# Patient Record
Sex: FEMALE | Race: WHITE | NOT HISPANIC OR LATINO | Employment: FULL TIME | URBAN - METROPOLITAN AREA
[De-identification: names, ages, dates, MRNs, and addresses within clinical notes are randomized per-mention and may not be internally consistent; named-entity substitution may affect disease eponyms.]

---

## 2017-02-06 RX ORDER — PAROXETINE HYDROCHLORIDE 20 MG/1
TABLET, FILM COATED ORAL
Qty: 90 TAB | Refills: 2 | Status: SHIPPED | OUTPATIENT
Start: 2017-02-06 | End: 2017-04-14

## 2017-02-07 DIAGNOSIS — G43.009 MIGRAINE WITHOUT AURA AND WITHOUT STATUS MIGRAINOSUS, NOT INTRACTABLE: ICD-10-CM

## 2017-02-07 RX ORDER — NARATRIPTAN 2.5 MG/1
TABLET ORAL
Qty: 6 TAB | Refills: 1 | Status: SHIPPED | OUTPATIENT
Start: 2017-02-07 | End: 2017-06-12 | Stop reason: SDUPTHER

## 2017-03-21 ENCOUNTER — PATIENT MESSAGE (OUTPATIENT)
Dept: INTERNAL MEDICINE | Facility: IMAGING CENTER | Age: 37
End: 2017-03-21

## 2017-03-21 NOTE — TELEPHONE ENCOUNTER
From: Breanne Andrea  To: Caro Paz M.D.  Sent: 3/21/2017 1:05 PM PDT  Subject: Prescription Question    Hi Dr. Paz,    Over the last few months, I have really been struggling with my depression and just can't seem to get over this antonella. I am currently taking 20mg of Paxil daily and was wondering if maybe upping it to 30 or 40 may be okay? I am very irritable, just want to sleep all the time, and basically find no nicho in anything right now. However, I am not having any suicidal thoughts. Just sucking at life right now. Let me know if I need to come in or if I can try upping my dose.    Thanks.

## 2017-03-23 ENCOUNTER — OFFICE VISIT (OUTPATIENT)
Dept: INTERNAL MEDICINE | Facility: IMAGING CENTER | Age: 37
End: 2017-03-23
Payer: COMMERCIAL

## 2017-03-23 VITALS
BODY MASS INDEX: 27.66 KG/M2 | OXYGEN SATURATION: 97 % | TEMPERATURE: 97.7 F | HEIGHT: 65 IN | HEART RATE: 78 BPM | WEIGHT: 166 LBS | RESPIRATION RATE: 14 BRPM | DIASTOLIC BLOOD PRESSURE: 70 MMHG | SYSTOLIC BLOOD PRESSURE: 110 MMHG

## 2017-03-23 DIAGNOSIS — F33.41 RECURRENT MAJOR DEPRESSIVE DISORDER, IN PARTIAL REMISSION (HCC): ICD-10-CM

## 2017-03-23 PROCEDURE — 99214 OFFICE O/P EST MOD 30 MIN: CPT | Performed by: FAMILY MEDICINE

## 2017-03-23 NOTE — MR AVS SNAPSHOT
"Breanne KATARINA Emre   3/23/2017 4:00 PM   Office Visit   MRN: [id number]    [facility]   Dept Phone:  [phone]    Description:  Female : 1980   Provider:  Caro Paz M.D.           Allergies as of 3/23/2017     No Known Allergies      Vital Signs     Blood Pressure Pulse Temperature Respirations Height Weight    110/70 mmHg 78 36.5 °C (97.7 °F) 14 1.651 m (5' 5\") 75.297 kg (166 lb)    Body Mass Index Oxygen Saturation Smoking Status             27.62 kg/m2 97% Never Smoker          Basic Information     Date Of Birth Sex Race Ethnicity Preferred Language    1980 Female White Non- English      Problem List              ICD-10-CM Priority Class Noted - Resolved    Depression F32.9   2014 - Present    Gastroesophageal reflux disease without esophagitis K21.9   2015 - Present      Health Maintenance        Date Due Completion Dates    IMM DTaP/Tdap/Td Vaccine (1 - Tdap) 2001 ---    IMM INFLUENZA (1) 2016 ---    PAP SMEAR 2019, 2013 (Done)    Override on 2013: Done (women health)            Current Immunizations     No immunizations on file.      Below and/or attached are the medications your provider expects you to take. Review all of your home medications and newly ordered medications with your provider and/or pharmacist. Follow medication instructions as directed by your provider and/or pharmacist. Please keep your medication list with you and share with your provider. Update the information when medications are discontinued, doses are changed, or new medications (including over-the-counter products) are added; and carry medication information at all times in the event of emergency situations     Allergies:  No Known Allergies          Medications  Valid as of: 2017 -  4:27 PM    Generic Name Brand Name Tablet Size Instructions for use    Aspirin (Tablet Delayed Response) ECOTRIN 81 MG Take 81 mg by mouth " every day.        Desogestrel-Ethinyl Estradiol (Tab) KARIVA 0.15-0.02/0.01 MG (21/5) Take active pill for 84 days straight, then inactive pill for 7 days. Please dispense 4 packets.        Naratriptan HCl (Tab) AMERGE 2.5 MG Take one tablet at onset of migraine. May repeat one time in 24 hours.        Omeprazole (CAPSULE DELAYED RELEASE) PRILOSEC 20 MG Take 20 mg by mouth every day.        PARoxetine HCl (Tab) PAXIL 20 MG TAKE 1 TABLET DAILY WITH FOOD        .                 Medicines prescribed today were sent to:     [facility]ARMACY #[id number] - [place], [place] - [address]    [address] [place] [address]    Phone: [phone] Fax: [phone]    Open 24 Hours?: No    [facility] - [place] [address]    Phone: [phone] Fax: [phone]    Open 24 Hours?: No    [facility] - [place] [address]    Phone: [phone] Fax: [phone]    Open 24 Hours?: No      Medication refill instructions:       If your prescription bottle indicates you have medication refills left, it is not necessary to call your provider’s office. Please contact your pharmacy and they will refill your medication.    If your prescription bottle indicates you do not have any refills left, you may request refills at any time through one of the following ways: The online [employer] system (except Urgent Care), by calling your provider’s office, or by asking your pharmacy to contact your provider’s office with a refill request. Medication refills are processed only during regular business hours and may not be available until the next business day. Your provider may request additional information or to have a follow-up visit with you prior to refilling your medication.   *Please Note: Medication refills are assigned a new Rx number when refilled electronically. Your pharmacy may  indicate that no refills were authorized even though a new prescription for the same medication is available at the pharmacy. Please request the medicine by name with the pharmacy before contacting your provider for a refill.           [employer] Access Code: Activation code not generated  Current Vericept Status: Active

## 2017-03-24 NOTE — PROGRESS NOTES
Chief Complaint   Patient presents with   • Depression       HISTORY OF PRESENT ILLNESS: Patient is a 37 y.o. female established patient who presents today to discuss depression.  She has a history of depression. She has been on Paxil for about 4 years. She was doing well until about 3 months ago. She noticed her mood was down. Motivation is down. Difficulty focusing and concentrating. No thoughts of hopelessness or suicide. She continues to work. She states she feels overwhelmed and finds it more difficult to finish tasks. She does have a good support system and family and friends. She is .  Her appetite has been good. In fact she states she is probably eating too much. She is not sleeping well. She has difficulty both initiating and maintaining sleep. States she feels like her mind is running.  She has been under some increased stress at work. There have been changes. But she still enjoys her job.  Her  has also started his own business and she is trying to help with that.  She has not been exercising but would like to. States she just has a difficult time getting motivated to exercise.  She has had counseling in the past. Is really not interested in that right now.  She occasionally drank alcohol on the weekend. Denies that it is a problem.  No recreational drug use.    She remembers trying a couple other medications prior to Paxil. Her record shows that she try Lexapro. She believes she tried citalopram. She had been doing well on the Paxil until the past few months. She feels like it just is not working as well.    Patient Active Problem List    Diagnosis Date Noted   • Gastroesophageal reflux disease without esophagitis 07/29/2015   • Depression 02/18/2014     Current Outpatient Prescriptions on File Prior to Visit   Medication Sig Dispense Refill   • paroxetine (PAXIL) 20 MG Tab TAKE 1 TABLET DAILY WITH FOOD 90 Tab 2   • desogestrel-ethinyl estradiol (KARIVA) 0.15-0.02/0.01 MG (21/5) per tablet  "Take active pill for 84 days straight, then inactive pill for 7 days. Please dispense 4 packets. 112 Tab 3   • aspirin EC (ECOTRIN) 81 MG TBEC Take 81 mg by mouth every day.     • omeprazole (PRILOSEC) 20 MG CPDR Take 20 mg by mouth every day.     • naratriptan (AMERGE) 2.5 MG tablet Take one tablet at onset of migraine. May repeat one time in 24 hours. 6 Tab 1     No current facility-administered medications on file prior to visit.       Past medical, surgical, family, and social history is reviewed and updated in Epic chart by me today.   Medications and allergies reviewed and updated in Epic chart by me today.     REVIEW OF SYSTEMS:  GENERAL: No fatigue, slight weight gain.  HEENT:  Ears--no earache, no change in hearing, no dizziness, no tinnitus.                 Eyes--no blurred vision, no discharge or pain                 Throat--No sore throat, no dysphagia, no hoarseness  CV:  No chest pain,dyspnea,palpitations or edema.  RESP:  No sob,cough,wheezing or hemoptysis.  GI: No dysphagia, heartburn,abdominal pain, nausea, vomiting, diarrhea or constipation.       No melena, jaundice, bleeding, incontinence or change in bowel habits.  :  No dysuria, polyuria, hematuria, incontinence, or nocturia.  MS:  No joint swelling, myalgias, or arthralgias.  NEURO:  No seizures, syncope, paralysis, tremor, or weakness.  SKIN: No new or concerning skin lesions or changes.   PSYCH: Mood as above.    Filed Vitals:    03/23/17 1600   BP: 110/70   Pulse: 78   Temp: 36.5 °C (97.7 °F)   Resp: 14   Height: 1.651 m (5' 5\")   Weight: 75.297 kg (166 lb)   SpO2: 97%     Physical Exam:  Gen: Well developed, well nourished. No acute distress.  Neck:  Supple, no adenopathy or thyromegaly.  Heart:  Regular rate and rhythm.  Normal S1, S2. No murmur, gallop or rub.  Lungs:  Clear, No wheezes,rales or rhonchi.  Extremities:  No edema.  Psych: Mood and affect are as above. She makes good eye contact. Affect is varied. Thought process is " appropriate    Assessment/Plan:  1. Recurrent major depressive disorder, in partial remission (CMS-HCC)   we discussed increasing Paxil or trying something new and she is agreeable to trying  Trintellix. We'll start 10 mg. She'll discontinue Paxil. Encouraged meditation, a walk daily. She will try and do that. We'll still consider counseling and will follow up in 3-4 weeks. Encouraged her to call for any increase in her symptoms, side effects of the medication or concerns.      Follow-up in 3-4 weeks.     30 minutes was face-to-face with patient, greater than 50% of this time spent in education, counseling and developing a treatment plan.

## 2017-04-14 ENCOUNTER — OFFICE VISIT (OUTPATIENT)
Dept: INTERNAL MEDICINE | Facility: IMAGING CENTER | Age: 37
End: 2017-04-14
Payer: COMMERCIAL

## 2017-04-14 VITALS
DIASTOLIC BLOOD PRESSURE: 70 MMHG | HEART RATE: 81 BPM | SYSTOLIC BLOOD PRESSURE: 112 MMHG | WEIGHT: 168 LBS | HEIGHT: 65 IN | OXYGEN SATURATION: 96 % | BODY MASS INDEX: 27.99 KG/M2 | RESPIRATION RATE: 14 BRPM | TEMPERATURE: 97.2 F

## 2017-04-14 DIAGNOSIS — G43.829 MENSTRUAL MIGRAINE WITHOUT STATUS MIGRAINOSUS, NOT INTRACTABLE: ICD-10-CM

## 2017-04-14 DIAGNOSIS — F33.41 RECURRENT MAJOR DEPRESSIVE DISORDER, IN PARTIAL REMISSION (HCC): ICD-10-CM

## 2017-04-14 PROCEDURE — 99213 OFFICE O/P EST LOW 20 MIN: CPT | Performed by: FAMILY MEDICINE

## 2017-04-14 NOTE — MR AVS SNAPSHOT
"        Breanne Andrea   2017 8:30 AM   Office Visit   MRN: [id number]    Department:  [facility]   Dept Phone:  [phone]    Description:  Female : 1980   Provider:  Caro Paz M.D.           Reason for Visit     Follow-Up           Allergies as of 2017     No Known Allergies      You were diagnosed with     Recurrent major depressive disorder, in partial remission (CMS-[facility])   [8954920]       Menstrual migraine without status migrainosus, not intractable   [[id number]]         Vital Signs     Blood Pressure Pulse Temperature Respirations Height Weight    112/70 mmHg 81 36.2 °C (97.2 °F) 14 1.651 m (5' 5\") 76.204 kg (168 lb)    Body Mass Index Oxygen Saturation Smoking Status             27.96 kg/m2 96% Never Smoker          Basic Information     Date Of Birth Sex Race Ethnicity Preferred Language    1980 Female White Non- English      Problem List              ICD-10-CM Priority Class Noted - Resolved    Depression F32.9   2014 - Present    Gastroesophageal reflux disease without esophagitis K21.9   2015 - Present      Health Maintenance        Date Due Completion Dates    IMM DTaP/Tdap/Td Vaccine (1 - Tdap) 2001 ---    PAP SMEAR 2019, 2013 (Done)    Override on 2013: Done (women health)            Current Immunizations     No immunizations on file.      Below and/or attached are the medications your provider expects you to take. Review all of your home medications and newly ordered medications with your provider and/or pharmacist. Follow medication instructions as directed by your provider and/or pharmacist. Please keep your medication list with you and share with your provider. Update the information when medications are discontinued, doses are changed, or new medications (including over-the-counter products) are added; and carry medication information at all times in the event of emergency situations     Allergies:  No Known " Allergies          Medications  Valid as of: April 14, 2017 - 10:10 AM    Generic Name Brand Name Tablet Size Instructions for use    Aspirin (Tablet Delayed Response) ECOTRIN 81 MG Take 81 mg by mouth every day.        Desogestrel-Ethinyl Estradiol (Tab) KARIVA 0.15-0.02/0.01 MG (21/5) Take active pill for 84 days straight, then inactive pill for 7 days. Please dispense 4 packets.        Naratriptan HCl (Tab) AMERGE 2.5 MG Take one tablet at onset of migraine. May repeat one time in 24 hours.        Omeprazole (CAPSULE DELAYED RELEASE) PRILOSEC 20 MG Take 20 mg by mouth every day.        Vortioxetine HBr (Tab) Vortioxetine HBr 10 MG Take 10 mg by mouth every day.        .                 Medicines prescribed today were sent to:     [facility]ACY #[id number] - [place], [place] [address]    [address] [place] [place] [address]    Phone: [phone] Fax: [phone]    Open 24 Hours?: No    [facility] - [place] [address]    Phone: [phone] Fax: [phone]    Open 24 Hours?: No    [facility] - [place] [address]    Phone: [phone] Fax: [phone]    Open 24 Hours?: No      Medication refill instructions:       If your prescription bottle indicates you have medication refills left, it is not necessary to call your provider’s office. Please contact your pharmacy and they will refill your medication.    If your prescription bottle indicates you do not have any refills left, you may request refills at any time through one of the following ways: The online [employer] system (except Urgent Care), by calling your provider’s office, or by asking your pharmacy to contact your provider’s office with a refill request. Medication refills are processed only during regular business hours and may not be available until the next business day. Your provider may request  additional information or to have a follow-up visit with you prior to refilling your medication.   *Please Note: Medication refills are assigned a new Rx number when refilled electronically. Your pharmacy may indicate that no refills were authorized even though a new prescription for the same medication is available at the pharmacy. Please request the medicine by name with the pharmacy before contacting your provider for a refill.           [employer] Access Code: Activation code not generated  Current Shuamet Status: Active

## 2017-04-17 RX ORDER — DESOGESTREL AND ETHINYL ESTRADIOL AND ETHINYL ESTRADIOL 21-5 (28)
KIT ORAL
Qty: 112 TAB | Refills: 2 | Status: SHIPPED | OUTPATIENT
Start: 2017-04-17 | End: 2018-01-14 | Stop reason: SDUPTHER

## 2017-06-12 RX ORDER — NARATRIPTAN 2.5 MG/1
TABLET ORAL
Qty: 6 TAB | Refills: 1 | Status: SHIPPED | OUTPATIENT
Start: 2017-06-12 | End: 2017-09-18 | Stop reason: SDUPTHER

## 2017-06-14 ENCOUNTER — OFFICE VISIT (OUTPATIENT)
Dept: INTERNAL MEDICINE | Facility: IMAGING CENTER | Age: 37
End: 2017-06-14
Payer: COMMERCIAL

## 2017-06-14 VITALS
OXYGEN SATURATION: 97 % | RESPIRATION RATE: 14 BRPM | WEIGHT: 168 LBS | BODY MASS INDEX: 27.99 KG/M2 | HEART RATE: 84 BPM | DIASTOLIC BLOOD PRESSURE: 70 MMHG | HEIGHT: 65 IN | SYSTOLIC BLOOD PRESSURE: 110 MMHG | TEMPERATURE: 97.9 F

## 2017-06-14 DIAGNOSIS — K52.9 GASTROENTERITIS: ICD-10-CM

## 2017-06-14 DIAGNOSIS — R11.0 NAUSEA: ICD-10-CM

## 2017-06-14 PROCEDURE — 99213 OFFICE O/P EST LOW 20 MIN: CPT | Performed by: FAMILY MEDICINE

## 2017-06-14 RX ORDER — ONDANSETRON 4 MG/1
4 TABLET, ORALLY DISINTEGRATING ORAL EVERY 6 HOURS PRN
Qty: 20 TAB | Refills: 0 | Status: SHIPPED | OUTPATIENT
Start: 2017-06-14 | End: 2017-08-24

## 2017-06-14 NOTE — MR AVS SNAPSHOT
"Breanne BRAY Emre   2017 9:00 AM   Office Visit   MRN: [id number]    [facility]   Dept Phone:  [phone]    Description:  Female : 1980   Provider:  Caro Paz M.D.           Reason for Visit     Nausea GI sx      Allergies as of 2017     No Known Allergies      You were diagnosed with     Nausea   [[id number]]       Gastroenteritis   [[id number]]         Vital Signs     Blood Pressure Pulse Temperature Respirations Height Weight    110/70 mmHg 84 36.6 °C (97.9 °F) 14 1.651 m (5' 5\") 76.204 kg (168 lb)    Body Mass Index Oxygen Saturation Smoking Status             27.96 kg/m2 97% Never Smoker          Basic Information     Date Of Birth Sex Race Ethnicity Preferred Language    1980 Female White Non- English      Problem List              ICD-10-CM Priority Class Noted - Resolved    Depression F32.9   2014 - Present    Gastroesophageal reflux disease without esophagitis K21.9   2015 - Present      Health Maintenance        Date Due Completion Dates    IMM DTaP/Tdap/Td Vaccine (1 - Tdap) 2001 ---    PAP SMEAR 2019, 2013 (Done)    Override on 2013: Done (women health)            Current Immunizations     No immunizations on file.      Below and/or attached are the medications your provider expects you to take. Review all of your home medications and newly ordered medications with your provider and/or pharmacist. Follow medication instructions as directed by your provider and/or pharmacist. Please keep your medication list with you and share with your provider. Update the information when medications are discontinued, doses are changed, or new medications (including over-the-counter products) are added; and carry medication information at all times in the event of emergency situations     Allergies:  No Known Allergies          Medications  Valid as of: 2017 - 10:37 AM    Generic Name Brand Name Tablet Size " Instructions for use    Aspirin (Tablet Delayed Response) ECOTRIN 81 MG Take 81 mg by mouth every day.        Desogestrel-Ethinyl Estradiol (Tab) VIORELE 0.15-0.02/0.01 MG (21/5) TAKE 1 ACTIVE TABLET FOR 84 DAYS STRAIGHT, THEN TAKE INACTIVE TABLET FOR 7 DAYS AS DIRECTED        Naratriptan HCl (Tab) AMERGE 2.5 MG TAKE 1 TABLET AT ONSET OF MIGRAINE. MAY REPEAT ONE TIME IN 24 HOURS        Omeprazole (CAPSULE DELAYED RELEASE) PRILOSEC 20 MG Take 20 mg by mouth every day.        Ondansetron (TABLET DISPERSIBLE) ZOFRAN ODT 4 MG Take 1 Tab by mouth every 6 hours as needed for Nausea/Vomiting.        Vortioxetine HBr (Tab) Vortioxetine HBr 10 MG Take 10 mg by mouth every day.        .                 Medicines prescribed today were sent to:     [facility]ARMACY #[id number] - [place], [place] [address]    [address] [place] [address]    Phone: [phone] Fax: [phone]    Open 24 Hours?: No    [facility] - [place] [address]    Phone: [phone] Fax: [phone]    Open 24 Hours?: No    [facility] - [place] [address]    Phone: [phone] Fax: [phone]    Open 24 Hours?: No      Medication refill instructions:       If your prescription bottle indicates you have medication refills left, it is not necessary to call your provider’s office. Please contact your pharmacy and they will refill your medication.    If your prescription bottle indicates you do not have any refills left, you may request refills at any time through one of the following ways: The online [employer] system (except Urgent Care), by calling your provider’s office, or by asking your pharmacy to contact your provider’s office with a refill request. Medication refills are processed only during regular business hours and may not be available until the next business day. Your provider may  request additional information or to have a follow-up visit with you prior to refilling your medication.   *Please Note: Medication refills are assigned a new Rx number when refilled electronically. Your pharmacy may indicate that no refills were authorized even though a new prescription for the same medication is available at the pharmacy. Please request the medicine by name with the pharmacy before contacting your provider for a refill.           [employer] Access Code: Activation code not generated  Current Ferevot Status: Active

## 2017-06-14 NOTE — PROGRESS NOTES
Chief Complaint   Patient presents with   • Nausea     GI sx       HISTORY OF PRESENT ILLNESS: Patient is a 37 y.o. female established patient who presents today complaining of nausea, vomiting and diarrhea.  She started about 10 days ago with a migraine. She was taking extra Advil at the time and a couple days later developed nausea. She thought at first it was secondary to the Advil. Then she developed vomiting and diarrhea for 2 days. The diarrhea has resolved. She still nauseous. She vomited again last night. The nausea is worse after she eats. She's had some abdominal cramping. No fevers or chills. No recent travel. No one else has been sick. She is now starting to follow constipated because she hasn't had a good bowel movement for a few days. She did have a bowel movement yesterday morning it was very small. She denies any blood, no hematemesis, no blood or dark stools. She has continued on omeprazole daily. She does have a history of GERD. She is not concerned about pregnancy. She just had her menses and she is taking her birth control pills every day.      Patient Active Problem List    Diagnosis Date Noted   • Gastroesophageal reflux disease without esophagitis 07/29/2015   • Depression 02/18/2014     Current Outpatient Prescriptions on File Prior to Visit   Medication Sig Dispense Refill   • naratriptan (AMERGE) 2.5 MG tablet TAKE 1 TABLET AT ONSET OF MIGRAINE. MAY REPEAT ONE TIME IN 24 HOURS 6 Tab 1   • VIORELE 0.15-0.02/0.01 MG (21/5) per tablet TAKE 1 ACTIVE TABLET FOR 84 DAYS STRAIGHT, THEN TAKE INACTIVE TABLET FOR 7 DAYS AS DIRECTED 112 Tab 2   • Vortioxetine HBr 10 MG Tab Take 10 mg by mouth every day. 30 Tab 6   • aspirin EC (ECOTRIN) 81 MG TBEC Take 81 mg by mouth every day.     • omeprazole (PRILOSEC) 20 MG CPDR Take 20 mg by mouth every day.       No current facility-administered medications on file prior to visit.         Past medical, surgical, family, and social history is reviewed and updated  "in Epic chart by me today.   Medications and allergies reviewed and updated in Epic chart by me today.     REVIEW OF SYSTEMS:  GENERAL: Increased fatigue, no weight loss.  HEENT:  Ears--no earache, no change in hearing, no dizziness, no tinnitus.                 Eyes--no blurred vision, no discharge or pain                 Throat--No sore throat, no dysphagia, no hoarseness  CV:  No chest pain,dyspnea,palpitations or edema.  RESP:  No sob,cough,wheezing or hemoptysis.  GI: As above.  :  No dysuria, polyuria, hematuria, incontinence, or nocturia.  MS:  No joint swelling, myalgias, or arthralgias.  NEURO:  No seizures, syncope, paralysis, tremor, or weakness.  SKIN: No new or concerning skin lesions or changes.   PSYCH: Mood fine.    Filed Vitals:    06/14/17 0800   BP: 110/70   Pulse: 84   Temp: 36.6 °C (97.9 °F)   Resp: 14   Height: 1.651 m (5' 5\")   Weight: 76.204 kg (168 lb)   SpO2: 97%     Physical Exam:  Gen: Well developed, well nourished. No acute distress.  Neck:  Supple, no adenopathy or thyromegaly.  Heart:  Regular rate and rhythm.  Normal S1, S2. No murmur, gallop or rub.  Lungs:  Clear, No wheezes,rales or rhonchi.  Abdomen: Soft, nondistended. Increased bowel sounds. No hepatosplenomegaly or masses, or hernias. No rebound or guarding.  Mild lower abdominal tenderness.  Extremities:  No edema.  Psych: Mood and affect are appropriate.    Assessment/Plan:  1. Nausea. I suspect she has had a gastroenteritis. Will prescribe Zofran for the nausea. She may slowly increase her fluids and advance her diet. Continue omeprazole. Monitor her symptoms and call for any worsening of her symptoms, fevers, pain or if she is not improving in 48 hours.     2. Gastroenteritis            "

## 2017-08-20 ENCOUNTER — PATIENT MESSAGE (OUTPATIENT)
Dept: INTERNAL MEDICINE | Facility: IMAGING CENTER | Age: 37
End: 2017-08-20

## 2017-08-20 DIAGNOSIS — L30.9 CHRONIC ECZEMA: ICD-10-CM

## 2017-08-21 NOTE — TELEPHONE ENCOUNTER
From: Breanne Andrea  To: Caro Paz M.D.  Sent: 8/20/2017 11:07 AM PDT  Subject: Non-Urgent Medical Question    Hello Dr. Paz,    I was wondering if you could recommend a dermatologist? My eczema is really starting to get out of control all over my body. I am scratching in my sleep and breaking the skin. I have tried the sample you gave me of Eucrisa but when I use it, it burns so I didn't think I should keep using it. My  is now insistent I see somebody about it so I am reaching out to you. :) Let me know your thoughts.     Thank you,    Breanne Andrea

## 2017-08-24 ENCOUNTER — OFFICE VISIT (OUTPATIENT)
Dept: INTERNAL MEDICINE | Facility: IMAGING CENTER | Age: 37
End: 2017-08-24

## 2017-08-24 ENCOUNTER — HOSPITAL ENCOUNTER (OUTPATIENT)
Facility: MEDICAL CENTER | Age: 37
End: 2017-08-24
Attending: FAMILY MEDICINE
Payer: COMMERCIAL

## 2017-08-24 VITALS
OXYGEN SATURATION: 98 % | DIASTOLIC BLOOD PRESSURE: 70 MMHG | TEMPERATURE: 97.9 F | HEART RATE: 64 BPM | SYSTOLIC BLOOD PRESSURE: 112 MMHG | RESPIRATION RATE: 14 BRPM | WEIGHT: 164 LBS | HEIGHT: 65 IN | BODY MASS INDEX: 27.32 KG/M2

## 2017-08-24 DIAGNOSIS — G43.829 MENSTRUAL MIGRAINE WITHOUT STATUS MIGRAINOSUS, NOT INTRACTABLE: ICD-10-CM

## 2017-08-24 DIAGNOSIS — F33.41 RECURRENT MAJOR DEPRESSIVE DISORDER, IN PARTIAL REMISSION (HCC): ICD-10-CM

## 2017-08-24 DIAGNOSIS — Z00.00 ANNUAL PHYSICAL EXAM: ICD-10-CM

## 2017-08-24 DIAGNOSIS — L30.9 CHRONIC ECZEMA: ICD-10-CM

## 2017-08-24 LAB
ALBUMIN SERPL BCP-MCNC: 3.8 G/DL (ref 3.2–4.9)
ALBUMIN/GLOB SERPL: 1 G/DL
ALP SERPL-CCNC: 52 U/L (ref 30–99)
ALT SERPL-CCNC: 14 U/L (ref 2–50)
ANION GAP SERPL CALC-SCNC: 10 MMOL/L (ref 0–11.9)
AST SERPL-CCNC: 20 U/L (ref 12–45)
BASOPHILS # BLD AUTO: 0.6 % (ref 0–1.8)
BASOPHILS # BLD: 0.04 K/UL (ref 0–0.12)
BILIRUB SERPL-MCNC: 0.5 MG/DL (ref 0.1–1.5)
BUN SERPL-MCNC: 17 MG/DL (ref 8–22)
CALCIUM SERPL-MCNC: 9.8 MG/DL (ref 8.5–10.5)
CHLORIDE SERPL-SCNC: 107 MMOL/L (ref 96–112)
CHOLEST SERPL-MCNC: 174 MG/DL (ref 100–199)
CO2 SERPL-SCNC: 22 MMOL/L (ref 20–33)
CREAT SERPL-MCNC: 0.85 MG/DL (ref 0.5–1.4)
EOSINOPHIL # BLD AUTO: 0.04 K/UL (ref 0–0.51)
EOSINOPHIL NFR BLD: 0.6 % (ref 0–6.9)
ERYTHROCYTE [DISTWIDTH] IN BLOOD BY AUTOMATED COUNT: 45 FL (ref 35.9–50)
GFR SERPL CREATININE-BSD FRML MDRD: >60 ML/MIN/1.73 M 2
GLOBULIN SER CALC-MCNC: 3.9 G/DL (ref 1.9–3.5)
GLUCOSE SERPL-MCNC: 63 MG/DL (ref 65–99)
HCT VFR BLD AUTO: 44.1 % (ref 37–47)
HDLC SERPL-MCNC: 70 MG/DL
HGB BLD-MCNC: 14 G/DL (ref 12–16)
IMM GRANULOCYTES # BLD AUTO: 0.01 K/UL (ref 0–0.11)
IMM GRANULOCYTES NFR BLD AUTO: 0.1 % (ref 0–0.9)
LDLC SERPL CALC-MCNC: 92 MG/DL
LYMPHOCYTES # BLD AUTO: 2.44 K/UL (ref 1–4.8)
LYMPHOCYTES NFR BLD: 36.4 % (ref 22–41)
MCH RBC QN AUTO: 30.1 PG (ref 27–33)
MCHC RBC AUTO-ENTMCNC: 31.7 G/DL (ref 33.6–35)
MCV RBC AUTO: 94.8 FL (ref 81.4–97.8)
MONOCYTES # BLD AUTO: 0.41 K/UL (ref 0–0.85)
MONOCYTES NFR BLD AUTO: 6.1 % (ref 0–13.4)
NEUTROPHILS # BLD AUTO: 3.77 K/UL (ref 2–7.15)
NEUTROPHILS NFR BLD: 56.2 % (ref 44–72)
NRBC # BLD AUTO: 0 K/UL
NRBC BLD AUTO-RTO: 0 /100 WBC
PLATELET # BLD AUTO: 344 K/UL (ref 164–446)
PMV BLD AUTO: 10 FL (ref 9–12.9)
POTASSIUM SERPL-SCNC: 3.9 MMOL/L (ref 3.6–5.5)
PROT SERPL-MCNC: 7.7 G/DL (ref 6–8.2)
RBC # BLD AUTO: 4.65 M/UL (ref 4.2–5.4)
SODIUM SERPL-SCNC: 139 MMOL/L (ref 135–145)
TRIGL SERPL-MCNC: 59 MG/DL (ref 0–149)
TSH SERPL DL<=0.005 MIU/L-ACNC: 1.21 UIU/ML (ref 0.3–3.7)
WBC # BLD AUTO: 6.7 K/UL (ref 4.8–10.8)

## 2017-08-24 PROCEDURE — 80061 LIPID PANEL: CPT

## 2017-08-24 PROCEDURE — 84443 ASSAY THYROID STIM HORMONE: CPT

## 2017-08-24 PROCEDURE — 85025 COMPLETE CBC W/AUTO DIFF WBC: CPT

## 2017-08-24 PROCEDURE — 80053 COMPREHEN METABOLIC PANEL: CPT

## 2017-08-24 PROCEDURE — 99395 PREV VISIT EST AGE 18-39: CPT | Performed by: FAMILY MEDICINE

## 2017-08-24 RX ORDER — TRIAMCINOLONE ACETONIDE 1 MG/G
CREAM TOPICAL
Qty: 60 G | Refills: 1 | Status: SHIPPED | OUTPATIENT
Start: 2017-08-24 | End: 2022-10-28

## 2017-08-24 RX ORDER — PREDNISONE 10 MG/1
TABLET ORAL
Qty: 21 TAB | Refills: 0 | Status: SHIPPED | OUTPATIENT
Start: 2017-08-24 | End: 2018-09-07

## 2017-08-24 ASSESSMENT — PATIENT HEALTH QUESTIONNAIRE - PHQ9: CLINICAL INTERPRETATION OF PHQ2 SCORE: 0

## 2017-08-24 NOTE — MR AVS SNAPSHOT
"Breanne Andrea   2017 9:30 AM   Office Visit   MRN: [id number]    Department:  [facility]   Dept Phone:  [phone]    Description:  Female : 1980   Provider:  Caro Paz M.D.           Allergies as of 2017     No Known Allergies      You were diagnosed with     Annual physical exam   [[id number]]         Vital Signs     Blood Pressure Pulse Temperature Respirations Height Weight    112/70 mmHg 64 36.6 °C (97.9 °F) 14 1.651 m (5' 5\") 74.39 kg (164 lb)    Body Mass Index Oxygen Saturation Smoking Status             27.29 kg/m2 98% Never Smoker          Basic Information     Date Of Birth Sex Race Ethnicity Preferred Language    1980 Female White Non- English      Problem List              ICD-10-CM Priority Class Noted - Resolved    Depression F32.9   2014 - Present    Gastroesophageal reflux disease without esophagitis K21.9   2015 - Present      Health Maintenance        Date Due Completion Dates    IMM DTaP/Tdap/Td Vaccine (1 - Tdap) 2001 ---    IMM INFLUENZA (1) 2017 ---    PAP SMEAR 2019, 2013 (Done)    Override on 2013: Done (women health)            Current Immunizations     No immunizations on file.      Below and/or attached are the medications your provider expects you to take. Review all of your home medications and newly ordered medications with your provider and/or pharmacist. Follow medication instructions as directed by your provider and/or pharmacist. Please keep your medication list with you and share with your provider. Update the information when medications are discontinued, doses are changed, or new medications (including over-the-counter products) are added; and carry medication information at all times in the event of emergency situations     Allergies:  No Known Allergies          Medications  Valid as of: 2017 - 12:18 PM    Generic Name Brand Name Tablet Size Instructions for use   " Aspirin (Tablet Delayed Response) ECOTRIN 81 MG Take 81 mg by mouth every day.        Desogestrel-Ethinyl Estradiol (Tab) VIORELE 0.15-0.02/0.01 MG (21/5) TAKE 1 ACTIVE TABLET FOR 84 DAYS STRAIGHT, THEN TAKE INACTIVE TABLET FOR 7 DAYS AS DIRECTED        Naratriptan HCl (Tab) AMERGE 2.5 MG TAKE 1 TABLET AT ONSET OF MIGRAINE. MAY REPEAT ONE TIME IN 24 HOURS        Omeprazole (CAPSULE DELAYED RELEASE) PRILOSEC 20 MG Take 20 mg by mouth every day.        PredniSONE (Tab) DELTASONE 10 MG Take 2 daily for 1 week, then 1 daily until gone.        Triamcinolone Acetonide (Cream) KENALOG 0.1 % Apply to affected areas twice daily.        Vortioxetine HBr (Tab) Vortioxetine HBr 10 MG Take 10 mg by mouth every day.        .                 Medicines prescribed today were sent to:     [id number] [place] [address]    [address] [place] [address]    Phone: [phone] Fax: [phone]    Open 24 Hours?: No    [facility] - [place] [address]    Phone: [phone] Fax: [phone]    Open 24 Hours?: No    [facility] - [place] [address]    Phone: [phone] Fax: [phone]    Open 24 Hours?: No      Medication refill instructions:       If your prescription bottle indicates you have medication refills left, it is not necessary to call your provider’s office. Please contact your pharmacy and they will refill your medication.    If your prescription bottle indicates you do not have any refills left, you may request refills at any time through one of the following ways: The online [employer] system (except Urgent Care), by calling your provider’s office, or by asking your pharmacy to contact your provider’s office with a refill request. Medication refills are processed only during regular business hours and may not be available until the next  business day. Your provider may request additional information or to have a follow-up visit with you prior to refilling your medication.   *Please Note: Medication refills are assigned a new Rx number when refilled electronically. Your pharmacy may indicate that no refills were authorized even though a new prescription for the same medication is available at the pharmacy. Please request the medicine by name with the pharmacy before contacting your provider for a refill.        Your To Do List     Future Labs/Procedures Complete By Expires    CBC WITH DIFFERENTIAL  As directed 8/25/2018    COMP METABOLIC PANEL  As directed 8/25/2018    LIPID PROFILE  As directed 8/25/2018    TSH  As directed 8/25/2018         MyChart Access Code: Activation code not generated  [email or web address] Status: Active

## 2017-08-25 PROBLEM — G43.829 MENSTRUAL MIGRAINE WITHOUT STATUS MIGRAINOSUS, NOT INTRACTABLE: Status: ACTIVE | Noted: 2017-08-25

## 2017-08-25 NOTE — PROGRESS NOTES
CC:  Annual exam.    HPI:  Breanne is a 37 y.o. It is known patienthere for annual exam.She had a normal Pap smear last year. Pap smears have been normal. She is in a monogamous relationship. Denies any GYN complaints. She is on birth control pills and takes them for 3 months consecutively and then off for one week. She does this because of menstrual migraines. This has worked well for her.    Her migraines have been under control. She usually does get one during the week that she has her cycle, it responds well to Axert.    She is complaining of chronic eczema for years. She's had a flare lately.History erythematous, itchy patches on her hips, antecubital fossas, anterior chest. Tried samples of eucrisa and it caused burning.         Past Medical History   Diagnosis Date   • Anxiety    • GERD (gastroesophageal reflux disease)    • Depression 2/18/2014   • Chronic eczema        History reviewed. No pertinent past surgical history.    Family History   Problem Relation Age of Onset   • Cancer Mother      anal cancer , liver mets   • Hypertension Mother    • Heart Disease Father    • Hyperlipidemia Father    • Stroke Maternal Grandmother    • Heart Disease Paternal Grandfather    • Cancer Paternal Grandmother      GI       Social History   Substance Use Topics   • Smoking status: Never Smoker    • Smokeless tobacco: Never Used   • Alcohol Use: 1.5 oz/week     3 Glasses of wine per week      Comment: occ        Patient Active Problem List    Diagnosis Date Noted   • Menstrual migraine without status migrainosus, not intractable 08/25/2017   • Chronic eczema    • Gastroesophageal reflux disease without esophagitis 07/29/2015   • Depression 02/18/2014     Current Outpatient Prescriptions   Medication Sig Dispense Refill   • predniSONE (DELTASONE) 10 MG Tab Take 2 daily for 1 week, then 1 daily until gone. 21 Tab 0   • triamcinolone acetonide (KENALOG) 0.1 % Cream Apply to affected areas twice daily. 60 g 1   • VIORELE  "0.15-0.02/0.01 MG (21/5) per tablet TAKE 1 ACTIVE TABLET FOR 84 DAYS STRAIGHT, THEN TAKE INACTIVE TABLET FOR 7 DAYS AS DIRECTED 112 Tab 2   • Vortioxetine HBr 10 MG Tab Take 10 mg by mouth every day. 30 Tab 6   • aspirin EC (ECOTRIN) 81 MG TBEC Take 81 mg by mouth every day.     • omeprazole (PRILOSEC) 20 MG CPDR Take 20 mg by mouth every day.     • naratriptan (AMERGE) 2.5 MG tablet TAKE 1 TABLET AT ONSET OF MIGRAINE. MAY REPEAT ONE TIME IN 24 HOURS 6 Tab 1     No current facility-administered medications for this visit.      Current supplements: none        REVIEW OF SYSTEMS:  GENERAL: No fatigue, no weight loss.  HEENT:  Ears--no earache, no change in hearing, no dizziness, no tinnitus.                 Eyes--no blurred vision, no discharge or pain                 Throat--No sore throat, no dysphagia, no hoarseness  CV:  No chest pain,dyspnea,palpitations or edema.  RESP:  No sob,cough,wheezing or hemoptysis.  GI: No dysphagia, heartburn,abdominal pain, nausea, vomiting, diarrhea or constipation.       No melena, jaundice, bleeding, incontinence or change in bowel habits.  :  No dysuria, polyuria, hematuria, incontinence, or nocturia.  MS:  No joint swelling, myalgias, or arthralgias.  NEURO:  No seizures, syncope, paralysis, tremor, or weakness.  SKIN: As above.  PSYCH: Mood fine.--taking trintellix and feels she is doing well. Motivation is good. Denies thoughts of depression, hopelessness, or suicidal.          /70 mmHg  Pulse 64  Temp(Src) 36.6 °C (97.9 °F)  Resp 14  Ht 1.651 m (5' 5\")  Wt 74.39 kg (164 lb)  BMI 27.29 kg/m2  SpO2 98% Body mass index is 27.29 kg/(m^2).    PHYSICAL EXAM;  GENERAL;  WN/WD, No acute distress.   HEENT:  Head normocephalic and atraumatic.    PERRLA, EOMI. No conjunctival injection, no icterus.     TM's normal, nasal mucosa and mouth with no abnormalities.    Oropharynx is clear with no lesions.  NECK: Supple, no adenopathy or thyromegaly.  CV: RR. Normal S1,S2. No " murmur, gallop or rub.          No JVD, Carotid pulses 2+ and sym. No bruits.  LUNGS:  Clear, no wheezes, rales or rhonchi.  BREASTS: Symmetric, no masses or tenderness. No nipple discharge.  AXILLA:  No masses or tenderness.  ABDOMEN: Soft, NT, nondistended. Bowel sounds normal. No hepatosplenomegaly or masses. No rebound or guarding. No hernias.  EXTREMITIES:  No edema.   NEURO:  CN II-XII intact. Motor and sensation grossly intact  SKIN: Patches of dry, erythematous skin, both in the cubital fossa, hips and anterior chest.  Psych: Mood and affect are appropriate.            Assessment and Plan. The following treatment and monitoring plan is recommended:   1. Annual physical exam  Pap smear is up to date. Recommend Pap smear next year.  Recommend monthly self breast exam and mammogram at age 42.  Fasting lab work.  - CBC WITH DIFFERENTIAL; Future  - COMP METABOLIC PANEL; Future  - LIPID PROFILE; Future  - TSH; Future    2. Chronic eczema  Prednisone for 2 weeks to calm this down. May use triamcinolone and 1-2 times daily as needed. Cautioned regarding side effects of chronic use of steroid creams.    3. Recurrent major depressive disorder, in remission (CMS-HCC)  Doing very well on Trintellix.     4. Menstrual migraine without status migrainosus, not intractable  Controlled.     5. Healthcare Maintenance. Counseled re: nutrition, activity and safety. Reviewed immunizations.     Follow up 6  Months and prn.       ·

## 2017-09-18 RX ORDER — NARATRIPTAN 2.5 MG/1
TABLET ORAL
Qty: 6 TAB | Refills: 3 | Status: SHIPPED | OUTPATIENT
Start: 2017-09-18 | End: 2018-03-25 | Stop reason: SDUPTHER

## 2017-12-05 RX ORDER — VORTIOXETINE 10 MG/1
10 TABLET, FILM COATED ORAL DAILY
Qty: 30 TAB | Refills: 6 | Status: SHIPPED | OUTPATIENT
Start: 2017-12-05 | End: 2018-09-07

## 2018-01-15 RX ORDER — DESOGESTREL AND ETHINYL ESTRADIOL AND ETHINYL ESTRADIOL 21-5 (28)
KIT ORAL
Qty: 112 TAB | Refills: 2 | Status: SHIPPED | OUTPATIENT
Start: 2018-01-15 | End: 2018-09-17 | Stop reason: SDUPTHER

## 2018-03-26 RX ORDER — NARATRIPTAN 2.5 MG/1
TABLET ORAL
Qty: 6 TAB | Refills: 1 | Status: SHIPPED | OUTPATIENT
Start: 2018-03-26 | End: 2018-05-29 | Stop reason: SDUPTHER

## 2018-05-29 RX ORDER — NARATRIPTAN 2.5 MG/1
TABLET ORAL
Qty: 6 TAB | Refills: 1 | Status: SHIPPED | OUTPATIENT
Start: 2018-05-29 | End: 2018-09-17 | Stop reason: SDUPTHER

## 2018-09-07 ENCOUNTER — OFFICE VISIT (OUTPATIENT)
Dept: INTERNAL MEDICINE | Facility: IMAGING CENTER | Age: 38
End: 2018-09-07
Payer: COMMERCIAL

## 2018-09-07 ENCOUNTER — HOSPITAL ENCOUNTER (OUTPATIENT)
Dept: LAB | Facility: MEDICAL CENTER | Age: 38
End: 2018-09-07
Attending: FAMILY MEDICINE
Payer: COMMERCIAL

## 2018-09-07 ENCOUNTER — HOSPITAL ENCOUNTER (OUTPATIENT)
Facility: MEDICAL CENTER | Age: 38
End: 2018-09-07
Attending: FAMILY MEDICINE
Payer: COMMERCIAL

## 2018-09-07 VITALS
DIASTOLIC BLOOD PRESSURE: 70 MMHG | SYSTOLIC BLOOD PRESSURE: 132 MMHG | TEMPERATURE: 97.6 F | BODY MASS INDEX: 25.49 KG/M2 | RESPIRATION RATE: 14 BRPM | HEART RATE: 69 BPM | OXYGEN SATURATION: 97 % | HEIGHT: 65 IN | WEIGHT: 153 LBS

## 2018-09-07 DIAGNOSIS — G43.829 MENSTRUAL MIGRAINE WITHOUT STATUS MIGRAINOSUS, NOT INTRACTABLE: ICD-10-CM

## 2018-09-07 DIAGNOSIS — K21.9 GASTROESOPHAGEAL REFLUX DISEASE WITHOUT ESOPHAGITIS: ICD-10-CM

## 2018-09-07 DIAGNOSIS — Z00.00 PREVENTATIVE HEALTH CARE: ICD-10-CM

## 2018-09-07 DIAGNOSIS — F33.42 RECURRENT MAJOR DEPRESSIVE DISORDER, IN FULL REMISSION (HCC): ICD-10-CM

## 2018-09-07 DIAGNOSIS — Z12.4 SCREENING FOR MALIGNANT NEOPLASM OF CERVIX: ICD-10-CM

## 2018-09-07 DIAGNOSIS — Z20.2 POSSIBLE EXPOSURE TO STD: ICD-10-CM

## 2018-09-07 DIAGNOSIS — Z00.00 ANNUAL PHYSICAL EXAM: ICD-10-CM

## 2018-09-07 DIAGNOSIS — Z30.41 ENCOUNTER FOR SURVEILLANCE OF CONTRACEPTIVE PILLS: ICD-10-CM

## 2018-09-07 PROCEDURE — 87389 HIV-1 AG W/HIV-1&-2 AB AG IA: CPT

## 2018-09-07 PROCEDURE — 86803 HEPATITIS C AB TEST: CPT

## 2018-09-07 PROCEDURE — 87591 N.GONORRHOEAE DNA AMP PROB: CPT

## 2018-09-07 PROCEDURE — 87491 CHLMYD TRACH DNA AMP PROBE: CPT

## 2018-09-07 PROCEDURE — 99395 PREV VISIT EST AGE 18-39: CPT | Performed by: FAMILY MEDICINE

## 2018-09-07 PROCEDURE — 88175 CYTOPATH C/V AUTO FLUID REDO: CPT

## 2018-09-07 RX ORDER — SODIUM PHOSPHATE,MONO-DIBASIC 19G-7G/118
500 ENEMA (ML) RECTAL
COMMUNITY
End: 2020-06-22

## 2018-09-07 RX ORDER — ALPRAZOLAM 0.25 MG/1
0.25 TABLET ORAL
Refills: 1 | COMMUNITY

## 2018-09-07 RX ORDER — DOCUSATE CALCIUM 240 MG
240 CAPSULE ORAL 2 TIMES DAILY
COMMUNITY
End: 2022-10-28

## 2018-09-07 RX ORDER — BUPROPION HYDROCHLORIDE 150 MG/1
150 TABLET, EXTENDED RELEASE ORAL DAILY
Refills: 2 | COMMUNITY
Start: 2018-08-06 | End: 2019-10-01

## 2018-09-07 RX ORDER — VORTIOXETINE 20 MG/1
20 TABLET, FILM COATED ORAL DAILY
Refills: 2 | COMMUNITY
Start: 2018-08-06 | End: 2021-07-09 | Stop reason: SDUPTHER

## 2018-09-07 ASSESSMENT — PATIENT HEALTH QUESTIONNAIRE - PHQ9: CLINICAL INTERPRETATION OF PHQ2 SCORE: 0

## 2018-09-07 NOTE — PROGRESS NOTES
CC:  Annual exam.    HPI:  Breanne BRAY is a 38 y.o. established patient here for annual exam.  She has had a difficult year.  Her and her  have  although they do live together.  She has started seeing someone else.  Would like to be tested for STDs.  She has no symptoms.  No real concerns.    No GYN complaints.  She is on  oral contraceptives.  She takes them continuously for 3  cycles.  She does this to decrease the frequency of menstrual migraines.  She does get one about every 3 months.  She does use Amerge.  She has light bleeding on her hormone free week.    She is seeing a psychiatrist and a psychologist.  She is now on trintellix and Wellbutrin.  Rarely will take a Xanax.  She sees her psychologist every week.  She feels like she is any good place in regards to her mood.  She denies thoughts of hopelessness or suicide.    Past Medical History:   Diagnosis Date   • Anxiety    • Chronic eczema    • Depression 2/18/2014   • GERD (gastroesophageal reflux disease)        History reviewed. No pertinent surgical history.    Family History   Problem Relation Age of Onset   • Cancer Mother         anal cancer , liver mets   • Hypertension Mother    • Heart Disease Father    • Hyperlipidemia Father    • Cancer Paternal Grandmother         GI   • Stroke Maternal Grandmother    • Heart Disease Paternal Grandfather        Social History   Substance Use Topics   • Smoking status: Never Smoker   • Smokeless tobacco: Never Used   • Alcohol use 1.5 oz/week     3 Glasses of wine per week      Comment: occ     Counseling given: Not Answered     Patient Active Problem List    Diagnosis Date Noted   • Menstrual migraine without status migrainosus, not intractable 08/25/2017   • Chronic eczema    • Gastroesophageal reflux disease without esophagitis 07/29/2015   • Depression 02/18/2014     Current Outpatient Prescriptions   Medication Sig Dispense Refill   • buPROPion SR (WELLBUTRIN-SR) 150 MG TABLET SR 12 HR  sustained-release tablet Take 150 mg by mouth every day.  2   • TRINTELLIX 20 MG Tab Take 20 mg by mouth every day. TAKE 1 TABLET BY MOUTH DAILY  2   • vitamin D (CHOLECALCIFEROL) 1000 UNIT Tab Take 2,000 Units by mouth every day.     • glucosamine Sulfate 500 MG Cap Take 500 mg by mouth 3 times a day, with meals.     • docusate calcium (SURFAK) 240 MG Cap Take 240 mg by mouth 2 times a day.     • Psyllium (METAMUCIL FIBER PO) Take  by mouth.     • VIORELE 0.15-0.02/0.01 MG (21/5) per tablet TAKE 1 ACTIVE TABLET FOR 84 DAYS STRAIGHT THEN TAKE INACTIVE TABLETS FOR 7 DAYS AS DIRECTED. 112 Tab 2   • aspirin EC (ECOTRIN) 81 MG TBEC Take 81 mg by mouth every day.     • omeprazole (PRILOSEC) 20 MG CPDR Take 20 mg by mouth every day.     • ALPRAZolam (XANAX) 0.25 MG Tab TAKE 1 TABLET BY MOUTH DAILY AS NEEDED FOR 30 DAYS  1   • naratriptan (AMERGE) 2.5 MG tablet TAKE 1 TABLET AT ONSET OF MIGRAINE, MAY REPEAT ONE TIME IN 24 HOURS 6 Tab 1   • triamcinolone acetonide (KENALOG) 0.1 % Cream Apply to affected areas twice daily. 60 g 1     No current facility-administered medications for this visit.               REVIEW OF SYSTEMS:  GENERAL: No fatigue, 10 # weight loss.  HEENT:  Ears--no earache, no change in hearing, no dizziness, no tinnitus.                 Eyes--no blurred vision, no discharge or pain                 Throat--No sore throat, no dysphagia, no hoarseness  CV:  No chest pain,dyspnea,palpitations or edema.  RESP:  No sob,cough,wheezing or hemoptysis.  GI: No dysphagia, heartburn,abdominal pain, nausea, vomiting, diarrhea or constipation.       No melena, jaundice, bleeding, incontinence or change in bowel habits.  :  No dysuria, polyuria, hematuria, incontinence, or nocturia.  MS:  No joint swelling, myalgias, or arthralgias.  NEURO:  No seizures, syncope, paralysis, tremor, or weakness.  SKIN: No new or concerning skin lesions or changes.   PSYCH: Mood fine.          /70   Pulse 69   Temp 36.4 °C (97.6  "°F)   Resp 14   Ht 1.651 m (5' 5\")   Wt 69.4 kg (153 lb)   SpO2 97%   BMI 25.46 kg/m²  Body mass index is 25.46 kg/m².    PHYSICAL EXAM;  GENERAL;  WN/WD, No acute distress.   HEENT:  Head normocephalic and atraumatic.    PERRLA, EOMI. No conjunctival injection, no icterus.     TM's normal, nasal mucosa and mouth with no abnormalities.    Oropharynx is clear with no lesions.  NECK: Supple, no adenopathy or thyromegaly.  CV: RR. Normal S1,S2. No murmur, gallop or rub.          No JVD, Carotid pulses 2+ and sym. No bruits.  LUNGS:  Clear, no wheezes, rales or rhonchi.  BREASTS: Symmetric, no masses or tenderness. No nipple discharge.  AXILLA:  No masses or tenderness.  ABDOMEN: Soft, NT, nondistended. Bowel sounds normal. No hepatosplenomegaly or masses. No rebound or guarding. No hernias.  : external genitalia normal with no lesion. Vagina with no lesions or discharge. Cervix nontender with no lesion. Uterus nontender and normal size. Adnexa nontender with no mass.   EXTREMITIES:  No edema.   NEURO:  CN II-XII intact. Motor and sensation grossly intact.  SKIN: No rashes or abnormal lesions.  Psych: Mood and affect are appropriate.            Assessment and Plan. The following treatment and monitoring plan is recommended:   1. Annual physical exam  Pap smear today.  Recommend monthly self breast exams.  Mammogram at age 42.    2. Preventative health care  Pap smear along with GC and Chlamydia tests.  HIV and hep C tests.  - HEP C VIRUS ANTIBODY; Future  - HIV ANTIBODIES; Future    3. Screening for malignant neoplasm of cervix      4. Possible exposure to STD    - HEP C VIRUS ANTIBODY; Future  - HIV ANTIBODIES; Future    5. Encounter for surveillance of contraceptive pills  She will continue on her OCPs she does not smoke.    6. Gastroesophageal reflux disease without esophagitis  Controlled on omeprazole.    7. Recurrent major depressive disorder, in full remission (HCC)  Continue follow-up with both psychiatry " and psychology.  May continue with hemorrhage as needed.    8. Menstrual migraine without status migrainosus, not intractable  May continue with amerge as needed.    9. Healthcare Maintenance. Counseled re: nutrition, activity and safety. Reviewed immunizations.    follow up 6 months and as needed.      ·

## 2018-09-10 LAB
HCV AB SER QL: NEGATIVE
HIV 1+2 AB+HIV1 P24 AG SERPL QL IA: NON REACTIVE

## 2018-09-12 LAB
C TRACH DNA GENITAL QL NAA+PROBE: NEGATIVE
CYTOLOGY REG CYTOL: NORMAL
N GONORRHOEA DNA GENITAL QL NAA+PROBE: NEGATIVE
SPECIMEN SOURCE: NORMAL

## 2018-09-17 ENCOUNTER — PATIENT MESSAGE (OUTPATIENT)
Dept: INTERNAL MEDICINE | Facility: IMAGING CENTER | Age: 38
End: 2018-09-17

## 2018-09-17 RX ORDER — NARATRIPTAN 2.5 MG/1
2.5 TABLET ORAL
Qty: 18 TAB | Refills: 3 | Status: SHIPPED | OUTPATIENT
Start: 2018-09-17 | End: 2019-10-14 | Stop reason: SDUPTHER

## 2018-09-17 RX ORDER — DESOGESTREL AND ETHINYL ESTRADIOL 21-5 (28)
KIT ORAL
Qty: 112 TAB | Refills: 3 | Status: SHIPPED | OUTPATIENT
Start: 2018-09-17 | End: 2019-07-22 | Stop reason: SDUPTHER

## 2018-09-17 NOTE — TELEPHONE ENCOUNTER
From: Breanne Andrea  To: Caro Paz M.D.  Sent: 9/17/2018 4:36 PM PDT  Subject: Prescription Question    Hello. I have confirmed that my home delivery for medications with my new insurance is [facility]. They said that my doctor can send them an e-prescription. I used to have my birth control and migraine medication on home delivery through [facility]. Can you please provide [facility] a new prescription for these two medications?    Thank you.

## 2018-11-01 ENCOUNTER — OFFICE VISIT (OUTPATIENT)
Dept: INTERNAL MEDICINE | Facility: IMAGING CENTER | Age: 38
End: 2018-11-01
Payer: COMMERCIAL

## 2018-11-01 VITALS
BODY MASS INDEX: 25.49 KG/M2 | HEIGHT: 65 IN | DIASTOLIC BLOOD PRESSURE: 80 MMHG | RESPIRATION RATE: 14 BRPM | OXYGEN SATURATION: 97 % | TEMPERATURE: 97.6 F | SYSTOLIC BLOOD PRESSURE: 128 MMHG | HEART RATE: 90 BPM | WEIGHT: 153 LBS

## 2018-11-01 DIAGNOSIS — L29.0 PERIANAL ITCH: ICD-10-CM

## 2018-11-01 DIAGNOSIS — K64.4 SKIN TAG OF PERIANAL REGION: ICD-10-CM

## 2018-11-01 PROCEDURE — 99213 OFFICE O/P EST LOW 20 MIN: CPT | Performed by: FAMILY MEDICINE

## 2018-11-01 NOTE — PROGRESS NOTES
Chief Complaint   Patient presents with   • Anal Itching     x about 3 weeks       HISTORY OF PRESENT ILLNESS: Patient is a 38 y.o. female established patient who presents today complaining of perianal itching for about the past 3 and half weeks.  She thought initially she might have hemorrhoids.  She tends toward constipation but controls it well with stool softener and fiber.  She denies any blood.  No diarrhea.  Her bowels have been normal.  She has had some itching in the perianal area that is now extending to the posterior aspect of the vaginal area.  She was sexually active.  Has broke up with that partner.  Has not had sex for about 4 weeks.  She was seen on September seventh for her annual exam.  She was asymptomatic at that time.  Her Pap smear was normal.  STI testing was normal.  She denies any vaginal discharge.  She states the itching does keep her awake at night.  She has tried an over-the-counter Preparation H products.  It does not seem to be helping.  No new meds or contacts.      Patient Active Problem List    Diagnosis Date Noted   • Menstrual migraine without status migrainosus, not intractable 08/25/2017   • Chronic eczema    • Gastroesophageal reflux disease without esophagitis 07/29/2015   • Depression 02/18/2014     Current Outpatient Prescriptions on File Prior to Visit   Medication Sig Dispense Refill   • desogestrel-ethinyl estradiol (VIORELE) 0.15-0.02/0.01 MG (21/5) per tablet TAKE 1 ACTIVE TABLET FOR 84 DAYS STRAIGHT THEN TAKE INACTIVE TABLETS FOR 7 DAYS AS DIRECTED. 112 Tab 3   • buPROPion SR (WELLBUTRIN-SR) 150 MG TABLET SR 12 HR sustained-release tablet Take 150 mg by mouth every day.  2   • TRINTELLIX 20 MG Tab Take 20 mg by mouth every day. TAKE 1 TABLET BY MOUTH DAILY  2   • vitamin D (CHOLECALCIFEROL) 1000 UNIT Tab Take 2,000 Units by mouth every day.     • glucosamine Sulfate 500 MG Cap Take 500 mg by mouth 3 times a day, with meals.     • docusate calcium (SURFAK) 240 MG Cap  "Take 240 mg by mouth 2 times a day.     • Psyllium (METAMUCIL FIBER PO) Take  by mouth.     • aspirin EC (ECOTRIN) 81 MG TBEC Take 81 mg by mouth every day.     • omeprazole (PRILOSEC) 20 MG CPDR Take 20 mg by mouth every day.     • naratriptan (AMERGE) 2.5 MG tablet Take 1 Tab by mouth Once PRN for Migraine for up to 1 dose. May repeat in 2 hours if needed. Max of 2 doses in 24 hours 18 Tab 3   • ALPRAZolam (XANAX) 0.25 MG Tab TAKE 1 TABLET BY MOUTH DAILY AS NEEDED FOR 30 DAYS  1   • triamcinolone acetonide (KENALOG) 0.1 % Cream Apply to affected areas twice daily. 60 g 1     No current facility-administered medications on file prior to visit.        Past medical, surgical, family, and social history is reviewed and updated in Epic chart by me today.   Medications and allergies reviewed and updated in Epic chart by me today.     REVIEW OF SYSTEMS:  GENERAL: No fatigue, no weight loss.  CV:  No chest pain,dyspnea,palpitations or edema.  RESP:  No sob,cough,wheezing or hemoptysis.  GI: No dysphagia, heartburn,abdominal pain, nausea, vomiting, diarrhea or constipation.       No melena, jaundice, bleeding, incontinence or change in bowel habits. Perianal itching as above.   :  No dysuria, polyuria, hematuria, incontinence, or nocturia.  MS:  No joint swelling, myalgias, or arthralgias.  NEURO:  No seizures, syncope, paralysis, tremor, or weakness.  SKIN: No new or concerning skin lesions or changes.   PSYCH: Mood fine.    Vitals:    11/01/18 0800   BP: 128/80   Pulse: 90   Resp: 14   Temp: 36.4 °C (97.6 °F)   SpO2: 97%   Weight: 69.4 kg (153 lb)   Height: 1.651 m (5' 5\")     Physical Exam:  Gen: Well developed, well nourished. No acute distress.  Neck:  Supple, no adenopathy or thyromegaly.  Heart:  Regular rate and rhythm.  Normal S1, S2. No murmur, gallop or rub.  Lungs:  Clear, No wheezes,rales or rhonchi.  Abdomen: Soft, nontender, nondistended. Normal bowel sounds. No hepatosplenomegaly or masses, or hernias. No " rebound or guarding.  : external genitalia normal with no lesion. Vagina with no lesions or discharge. Cervix nontender with no lesion. Uterus nontender and normal size. Adnexa nontender with no mass.   She does have a perianal skin tag on her left side and a small one on her right side.  No verruca.  Mild erythema.  Skin is all intact.  Normal sphincter tone.  No palpable rectal masses.  Extremities:  No edema.  Psych: Mood and affect are appropriate.      Assessment/Plan:  1. Skin tag of perianal region.      2. Perianal itch     Recommend zinc oxide applied twice daily.  Keep the area as dry as possible.  Avoid tight clothing.  Use a gentle wipe after bowel movements.  Apply hydrocortisone 1% at night and take Benadryl 25 mg at night.  Encouraged her to call for concerns and recheck in 2 weeks.

## 2018-11-16 ENCOUNTER — HOSPITAL ENCOUNTER (OUTPATIENT)
Facility: MEDICAL CENTER | Age: 38
End: 2018-11-16
Attending: FAMILY MEDICINE
Payer: COMMERCIAL

## 2018-11-16 ENCOUNTER — OFFICE VISIT (OUTPATIENT)
Dept: INTERNAL MEDICINE | Facility: IMAGING CENTER | Age: 38
End: 2018-11-16
Payer: COMMERCIAL

## 2018-11-16 VITALS
RESPIRATION RATE: 14 BRPM | DIASTOLIC BLOOD PRESSURE: 70 MMHG | SYSTOLIC BLOOD PRESSURE: 118 MMHG | OXYGEN SATURATION: 98 % | HEART RATE: 78 BPM | BODY MASS INDEX: 25.49 KG/M2 | TEMPERATURE: 97.7 F | WEIGHT: 153 LBS | HEIGHT: 65 IN

## 2018-11-16 DIAGNOSIS — K64.4 SKIN TAG OF PERIANAL REGION: ICD-10-CM

## 2018-11-16 LAB — PATHOLOGY CONSULT NOTE: NORMAL

## 2018-11-16 PROCEDURE — 11420 EXC H-F-NK-SP B9+MARG 0.5/<: CPT | Performed by: FAMILY MEDICINE

## 2018-11-16 PROCEDURE — 88304 TISSUE EXAM BY PATHOLOGIST: CPT

## 2018-11-19 NOTE — PROGRESS NOTES
Chief Complaint   Patient presents with   • Skin Lesion     perianal skin tag.       HISTORY OF PRESENT ILLNESS: Patient is a 38 y.o. female established patient who presents today to follow-up on perianal itching.  Please see previous note.  She was having quite a bit of itching which has resolved.  However she did have a perianal skin tag on the left side.  This is continued to bother her.  She is anxious because her mother had anal cancer.  She has not noticed any external warts.  She had a recent normal Pap smear.    She does tend toward constipation.  She uses a stool softener and Metamucil.  She has had no bleeding.      Patient Active Problem List    Diagnosis Date Noted   • Menstrual migraine without status migrainosus, not intractable 08/25/2017   • Chronic eczema    • Gastroesophageal reflux disease without esophagitis 07/29/2015   • Depression 02/18/2014       Current Outpatient Prescriptions:   •  naratriptan (AMERGE) 2.5 MG tablet, Take 1 Tab by mouth Once PRN for Migraine for up to 1 dose. May repeat in 2 hours if needed. Max of 2 doses in 24 hours, Disp: 18 Tab, Rfl: 3  •  desogestrel-ethinyl estradiol (VIORELE) 0.15-0.02/0.01 MG (21/5) per tablet, TAKE 1 ACTIVE TABLET FOR 84 DAYS STRAIGHT THEN TAKE INACTIVE TABLETS FOR 7 DAYS AS DIRECTED., Disp: 112 Tab, Rfl: 3  •  ALPRAZolam (XANAX) 0.25 MG Tab, TAKE 1 TABLET BY MOUTH DAILY AS NEEDED FOR 30 DAYS, Disp: , Rfl: 1  •  buPROPion SR (WELLBUTRIN-SR) 150 MG TABLET SR 12 HR sustained-release tablet, Take 150 mg by mouth every day., Disp: , Rfl: 2  •  TRINTELLIX 20 MG Tab, Take 20 mg by mouth every day. TAKE 1 TABLET BY MOUTH DAILY, Disp: , Rfl: 2  •  vitamin D (CHOLECALCIFEROL) 1000 UNIT Tab, Take 2,000 Units by mouth every day., Disp: , Rfl:   •  glucosamine Sulfate 500 MG Cap, Take 500 mg by mouth 3 times a day, with meals., Disp: , Rfl:   •  docusate calcium (SURFAK) 240 MG Cap, Take 240 mg by mouth 2 times a day., Disp: , Rfl:   •  Psyllium (METAMUCIL  "FIBER PO), Take  by mouth., Disp: , Rfl:   •  triamcinolone acetonide (KENALOG) 0.1 % Cream, Apply to affected areas twice daily., Disp: 60 g, Rfl: 1  •  aspirin EC (ECOTRIN) 81 MG TBEC, Take 81 mg by mouth every day., Disp: , Rfl:   •  omeprazole (PRILOSEC) 20 MG CPDR, Take 20 mg by mouth every day., Disp: , Rfl:     Past medical, surgical, family, and social history is reviewed and updated in Epic chart by me today.   Medications and allergies reviewed and updated in Epic chart by me today.     REVIEW OF SYSTEMS:  GENERAL: No fatigue, no weight loss.  CV:  No chest pain,dyspnea,palpitations or edema.  RESP:  No sob,cough,wheezing or hemoptysis.  GI: No dysphagia, heartburn,abdominal pain, nausea, vomiting, diarrhea or constipation.       No melena, jaundice, bleeding, incontinence or change in bowel habits.       Perianal itching has resolved.  She still can feel the skin tag and would like it removed if possible.  :  No dysuria, polyuria, hematuria, incontinence, or nocturia.  MS:  No joint swelling, myalgias, or arthralgias.  NEURO:  No seizures, syncope, paralysis, tremor, or weakness.  SKIN: No new or concerning skin lesions or changes.   PSYCH: Mood fine.    Vitals:    11/16/18 0800   BP: 118/70   Pulse: 78   Resp: 14   Temp: 36.5 °C (97.7 °F)   TempSrc: Temporal   SpO2: 98%   Weight: 69.4 kg (153 lb)   Height: 1.651 m (5' 5\")     Physical Exam:  Gen: Well developed, well nourished. No acute distress.  Neck:  Supple, no adenopathy or thyromegaly.  Heart:  Regular rate and rhythm.  Normal S1, S2. No murmur, gallop or rub.  Lungs:  Clear, No wheezes,rales or rhonchi.  Abdomen: Soft, nontender, nondistended. Normal bowel sounds. No hepatosplenomegaly or masses, or hernias. No rebound or guarding.  Rectal: She does have a skin tag left perianal area.  It is about 3 mm in size.  There is less irritation.  She has a small skin tag on the right side.  No verruca.  No erythema.  Extremities:  No edema.  Psych: Mood " and affect are appropriate.      Assessment/Plan:  1. Skin tag of perianal region     Her itching is resolved.  The skin tag is bothersome to her.  With her family history of anal cancer I am recommending an excision and biopsy.  She agrees and consents.  Using sterile technique, the area is anesthetized using 1% Xylocaine.  The lesion is then excised with scissors at its base.  Bleeding is controlled with silver nitrate.  She tolerated the procedure well.  She is instructed in sitz baths twice daily for the next 2 days and apply Vaseline.  She is also encouraged to keep stools soft with her present regimen or the addition of Metamucil if needed.  Further follow-up pending biopsy results encouraged her to call for any concerns.

## 2019-07-22 RX ORDER — DESOG-E.ESTRADIOL/E.ESTRADIOL 21-5 (28)
TABLET ORAL
Qty: 112 TAB | Refills: 2 | Status: SHIPPED | OUTPATIENT
Start: 2019-07-22 | End: 2020-03-31

## 2019-09-26 DIAGNOSIS — Z00.00 PREVENTATIVE HEALTH CARE: ICD-10-CM

## 2019-09-27 ENCOUNTER — NON-PROVIDER VISIT (OUTPATIENT)
Dept: INTERNAL MEDICINE | Facility: IMAGING CENTER | Age: 39
End: 2019-09-27
Payer: COMMERCIAL

## 2019-09-27 ENCOUNTER — HOSPITAL ENCOUNTER (OUTPATIENT)
Facility: MEDICAL CENTER | Age: 39
End: 2019-09-27
Attending: FAMILY MEDICINE
Payer: COMMERCIAL

## 2019-09-27 DIAGNOSIS — Z00.00 PREVENTATIVE HEALTH CARE: ICD-10-CM

## 2019-09-27 LAB
ALBUMIN SERPL BCP-MCNC: 4 G/DL (ref 3.2–4.9)
ALBUMIN/GLOB SERPL: 1.2 G/DL
ALP SERPL-CCNC: 59 U/L (ref 30–99)
ALT SERPL-CCNC: 19 U/L (ref 2–50)
ANION GAP SERPL CALC-SCNC: 10 MMOL/L (ref 0–11.9)
AST SERPL-CCNC: 23 U/L (ref 12–45)
BASOPHILS # BLD AUTO: 0.5 % (ref 0–1.8)
BASOPHILS # BLD: 0.04 K/UL (ref 0–0.12)
BILIRUB SERPL-MCNC: 0.5 MG/DL (ref 0.1–1.5)
BUN SERPL-MCNC: 17 MG/DL (ref 8–22)
CALCIUM SERPL-MCNC: 9.4 MG/DL (ref 8.5–10.5)
CHLORIDE SERPL-SCNC: 107 MMOL/L (ref 96–112)
CHOLEST SERPL-MCNC: 197 MG/DL (ref 100–199)
CO2 SERPL-SCNC: 22 MMOL/L (ref 20–33)
CREAT SERPL-MCNC: 1.06 MG/DL (ref 0.5–1.4)
EOSINOPHIL # BLD AUTO: 0.07 K/UL (ref 0–0.51)
EOSINOPHIL NFR BLD: 0.9 % (ref 0–6.9)
ERYTHROCYTE [DISTWIDTH] IN BLOOD BY AUTOMATED COUNT: 43.1 FL (ref 35.9–50)
GLOBULIN SER CALC-MCNC: 3.4 G/DL (ref 1.9–3.5)
GLUCOSE SERPL-MCNC: 73 MG/DL (ref 65–99)
HCT VFR BLD AUTO: 43.5 % (ref 37–47)
HDLC SERPL-MCNC: 68 MG/DL
HGB BLD-MCNC: 14.3 G/DL (ref 12–16)
IMM GRANULOCYTES # BLD AUTO: 0.01 K/UL (ref 0–0.11)
IMM GRANULOCYTES NFR BLD AUTO: 0.1 % (ref 0–0.9)
LDLC SERPL CALC-MCNC: 112 MG/DL
LYMPHOCYTES # BLD AUTO: 2.4 K/UL (ref 1–4.8)
LYMPHOCYTES NFR BLD: 30.4 % (ref 22–41)
MCH RBC QN AUTO: 30.3 PG (ref 27–33)
MCHC RBC AUTO-ENTMCNC: 32.9 G/DL (ref 33.6–35)
MCV RBC AUTO: 92.2 FL (ref 81.4–97.8)
MONOCYTES # BLD AUTO: 0.52 K/UL (ref 0–0.85)
MONOCYTES NFR BLD AUTO: 6.6 % (ref 0–13.4)
NEUTROPHILS # BLD AUTO: 4.85 K/UL (ref 2–7.15)
NEUTROPHILS NFR BLD: 61.5 % (ref 44–72)
NRBC # BLD AUTO: 0 K/UL
NRBC BLD-RTO: 0 /100 WBC
PLATELET # BLD AUTO: 342 K/UL (ref 164–446)
PMV BLD AUTO: 9.8 FL (ref 9–12.9)
POTASSIUM SERPL-SCNC: 4.3 MMOL/L (ref 3.6–5.5)
PROT SERPL-MCNC: 7.4 G/DL (ref 6–8.2)
RBC # BLD AUTO: 4.72 M/UL (ref 4.2–5.4)
SODIUM SERPL-SCNC: 139 MMOL/L (ref 135–145)
TRIGL SERPL-MCNC: 87 MG/DL (ref 0–149)
TSH SERPL DL<=0.005 MIU/L-ACNC: 1.32 UIU/ML (ref 0.38–5.33)
WBC # BLD AUTO: 7.9 K/UL (ref 4.8–10.8)

## 2019-09-27 PROCEDURE — 85025 COMPLETE CBC W/AUTO DIFF WBC: CPT

## 2019-09-27 PROCEDURE — 84443 ASSAY THYROID STIM HORMONE: CPT

## 2019-09-27 PROCEDURE — 80053 COMPREHEN METABOLIC PANEL: CPT

## 2019-09-27 PROCEDURE — 80061 LIPID PANEL: CPT

## 2019-10-01 ENCOUNTER — OFFICE VISIT (OUTPATIENT)
Dept: INTERNAL MEDICINE | Facility: IMAGING CENTER | Age: 39
End: 2019-10-01
Payer: COMMERCIAL

## 2019-10-01 VITALS
HEIGHT: 65 IN | TEMPERATURE: 98.4 F | RESPIRATION RATE: 14 BRPM | HEART RATE: 76 BPM | OXYGEN SATURATION: 95 % | SYSTOLIC BLOOD PRESSURE: 110 MMHG | DIASTOLIC BLOOD PRESSURE: 74 MMHG | WEIGHT: 165 LBS | BODY MASS INDEX: 27.49 KG/M2

## 2019-10-01 DIAGNOSIS — G43.009 MIGRAINE WITHOUT AURA AND WITHOUT STATUS MIGRAINOSUS, NOT INTRACTABLE: ICD-10-CM

## 2019-10-01 DIAGNOSIS — K21.9 GASTROESOPHAGEAL REFLUX DISEASE WITHOUT ESOPHAGITIS: ICD-10-CM

## 2019-10-01 DIAGNOSIS — Z00.00 ANNUAL PHYSICAL EXAM: ICD-10-CM

## 2019-10-01 DIAGNOSIS — F33.42 RECURRENT MAJOR DEPRESSIVE DISORDER, IN FULL REMISSION (HCC): ICD-10-CM

## 2019-10-01 DIAGNOSIS — Z23 NEED FOR INFLUENZA VACCINATION: ICD-10-CM

## 2019-10-01 PROCEDURE — 90686 IIV4 VACC NO PRSV 0.5 ML IM: CPT | Performed by: FAMILY MEDICINE

## 2019-10-01 PROCEDURE — 99395 PREV VISIT EST AGE 18-39: CPT | Mod: 25 | Performed by: FAMILY MEDICINE

## 2019-10-01 PROCEDURE — 90471 IMMUNIZATION ADMIN: CPT | Performed by: FAMILY MEDICINE

## 2019-10-01 RX ORDER — TOPIRAMATE 25 MG/1
25 TABLET ORAL 2 TIMES DAILY
Qty: 60 TAB | Refills: 3 | Status: SHIPPED | OUTPATIENT
Start: 2019-10-01 | End: 2020-01-23

## 2019-10-01 RX ORDER — BUPROPION HYDROCHLORIDE 200 MG/1
200 TABLET, EXTENDED RELEASE ORAL DAILY
COMMUNITY
End: 2021-03-31

## 2019-10-01 ASSESSMENT — PATIENT HEALTH QUESTIONNAIRE - PHQ9
4. FEELING TIRED OR HAVING LITTLE ENERGY: NOT AT ALL
7. TROUBLE CONCENTRATING ON THINGS, SUCH AS READING THE NEWSPAPER OR WATCHING TELEVISION: NOT AT ALL
3. TROUBLE FALLING OR STAYING ASLEEP OR SLEEPING TOO MUCH: NOT AT ALL
1. LITTLE INTEREST OR PLEASURE IN DOING THINGS: NOT AT ALL
SUM OF ALL RESPONSES TO PHQ QUESTIONS 1-9: 0
5. POOR APPETITE OR OVEREATING: NOT AT ALL
8. MOVING OR SPEAKING SO SLOWLY THAT OTHER PEOPLE COULD HAVE NOTICED. OR THE OPPOSITE, BEING SO FIGETY OR RESTLESS THAT YOU HAVE BEEN MOVING AROUND A LOT MORE THAN USUAL: NOT AT ALL
2. FEELING DOWN, DEPRESSED, IRRITABLE, OR HOPELESS: NOT AT ALL
SUM OF ALL RESPONSES TO PHQ9 QUESTIONS 1 AND 2: 0
9. THOUGHTS THAT YOU WOULD BE BETTER OFF DEAD, OR OF HURTING YOURSELF: NOT AT ALL
6. FEELING BAD ABOUT YOURSELF - OR THAT YOU ARE A FAILURE OR HAVE LET YOURSELF OR YOUR FAMILY DOWN: NOT AL ALL

## 2019-10-02 PROBLEM — G43.009 MIGRAINE WITHOUT AURA AND WITHOUT STATUS MIGRAINOSUS, NOT INTRACTABLE: Status: ACTIVE | Noted: 2019-10-02

## 2019-10-02 NOTE — PROGRESS NOTES
CC:  Annual exam.    HPI:  Breanne BRAY is a 39 y.o. established female patient here for annual exam.  She is up-to-date on her Pap smear.  It was normal last year.  No GYN complaints.  She is on birth control and takes that for 3 months consecutively due to menstrual migraines.  She will have a light period on her off week.    Her major concern is increased headaches.  She has history of migraines.  She states typically they were menstrual migraines and adjusting her pills made it so she was only having a couple around the time of her menses.  She states now she seems to be having at least 1 or 2/week.  She has not really found a pattern.  She can tell they are coming on and that she feels foggy and tired.  Headaches are lasting 1 to 2 days.  She does take Amerge and it does help but she is finding that she is taking it about twice a week.  She has never taken prophylactic medications.  She does get photophobia.  Occasional nausea.  She is also been taking ibuprofen which bothers her stomach.    She does also have a history of depression and is on Trintellix and Wellbutrin.  She is followed by Dr. Novak.  She states her mood is in a good place.  She is  from her  and anticipating a divorce.  She does have a new partner and states things are going well.      Past Medical History:   Diagnosis Date   • Anxiety    • Chronic eczema    • Depression 2/18/2014   • GERD (gastroesophageal reflux disease)        History reviewed. No pertinent surgical history.    Family History   Problem Relation Age of Onset   • Cancer Mother         anal cancer , liver mets   • Hypertension Mother    • Heart Disease Father    • Hyperlipidemia Father    • Cancer Paternal Grandmother         GI   • Stroke Maternal Grandmother    • Heart Disease Paternal Grandfather        Social History     Tobacco Use   • Smoking status: Never Smoker   • Smokeless tobacco: Never Used   Substance Use Topics   • Alcohol use: Yes     Alcohol/week:  1.5 oz     Types: 3 Glasses of wine per week     Comment: occ   • Drug use: No        Patient Active Problem List    Diagnosis Date Noted   • Migraine without aura and without status migrainosus, not intractable 10/02/2019   • Menstrual migraine without status migrainosus, not intractable 08/25/2017   • Chronic eczema    • Gastroesophageal reflux disease without esophagitis 07/29/2015   • Depression 02/18/2014     Current Outpatient Medications   Medication Sig Dispense Refill   • buPROPion (WELLBUTRIN SR) 200 MG SR tablet Take 200 mg by mouth every day.     • topiramate (TOPAMAX) 25 MG Tab Take 1 Tab by mouth 2 times a day. 60 Tab 3   • KARIVA 0.15-0.02/0.01 MG (21/5) per tablet TAKE 1 ACTIVE TABLET FOR 84 DAYS STRAIGHT THEN TAKE INACTIVE TABLETS FOR 7 DAYS AS DIRECTED 112 Tab 2   • naratriptan (AMERGE) 2.5 MG tablet Take 1 Tab by mouth Once PRN for Migraine for up to 1 dose. May repeat in 2 hours if needed. Max of 2 doses in 24 hours 18 Tab 3   • ALPRAZolam (XANAX) 0.25 MG Tab TAKE 1 TABLET BY MOUTH DAILY AS NEEDED FOR 30 DAYS  1   • TRINTELLIX 20 MG Tab Take 20 mg by mouth every day. TAKE 1 TABLET BY MOUTH DAILY  2   • vitamin D (CHOLECALCIFEROL) 1000 UNIT Tab Take 2,000 Units by mouth every day.     • glucosamine Sulfate 500 MG Cap Take 500 mg by mouth 3 times a day, with meals.     • docusate calcium (SURFAK) 240 MG Cap Take 240 mg by mouth 2 times a day.     • Psyllium (METAMUCIL FIBER PO) Take  by mouth.     • omeprazole (PRILOSEC) 20 MG CPDR Take 20 mg by mouth every day.     • triamcinolone acetonide (KENALOG) 0.1 % Cream Apply to affected areas twice daily. 60 g 1     No current facility-administered medications for this visit.       Current supplements: as above        REVIEW OF SYSTEMS:  GENERAL: No fatigue, no weight loss.  HEENT:  Ears--no earache, no change in hearing, no dizziness, no tinnitus.                 Eyes--no blurred vision, no discharge or pain                 Throat--No sore throat, no  "dysphagia, no hoarseness  CV:  No chest pain,dyspnea,palpitations or edema.  RESP:  No sob,cough,wheezing or hemoptysis.  GI: No dysphagia, heartburn,abdominal pain, nausea, vomiting, diarrhea or constipation.       No melena, jaundice, bleeding, incontinence or change in bowel habits.  :  No dysuria, polyuria, hematuria, incontinence, or nocturia.  MS:  No joint swelling, myalgias, or arthralgias.  NEURO:  No seizures, syncope, paralysis, tremor, or weakness.  Headaches as above  SKIN: No new or concerning skin lesions or changes.   PSYCH: Mood fine.          /74   Pulse 76   Temp 36.9 °C (98.4 °F) (Temporal)   Resp 14   Ht 1.651 m (5' 5\")   Wt 74.8 kg (165 lb)   SpO2 95%   BMI 27.46 kg/m²  Body mass index is 27.46 kg/m².    PHYSICAL EXAM;  GENERAL;  WN/WD, No acute distress.   HEENT:  Head normocephalic and atraumatic.    PERRLA, EOMI. No conjunctival injection, no icterus.     TM's normal, nasal mucosa and mouth with no abnormalities.    Oropharynx is clear with no lesions.  NECK: Supple, no adenopathy or thyromegaly.  CV: RR. Normal S1,S2. No murmur, gallop or rub.          No JVD, Carotid pulses 2+ and sym. No bruits.  LUNGS:  Clear, no wheezes, rales or rhonchi.  BREASTS: Symmetric, no masses or tenderness. No nipple discharge.  AXILLA:  No masses or tenderness.  ABDOMEN: Soft, NT, nondistended. Bowel sounds normal. No hepatosplenomegaly or masses. No rebound or guarding. No hernias.  EXTREMITIES:  No edema.   NEURO:  CN II-XII intact. Motor and sensation grossly intact.   SKIN: No rashes or abnormal lesions.  Psych: Mood and affect are appropriate.      Lab Results   Component Value Date/Time    CHOLSTRLTOT 197 09/27/2019 09:00 AM     (H) 09/27/2019 09:00 AM    HDL 68 09/27/2019 09:00 AM    TRIGLYCERIDE 87 09/27/2019 09:00 AM       Lab Results   Component Value Date/Time    SODIUM 139 09/27/2019 09:00 AM    POTASSIUM 4.3 09/27/2019 09:00 AM    CHLORIDE 107 09/27/2019 09:00 AM    CO2 22 " 09/27/2019 09:00 AM    GLUCOSE 73 09/27/2019 09:00 AM    BUN 17 09/27/2019 09:00 AM    CREATININE 1.06 09/27/2019 09:00 AM     Lab Results   Component Value Date/Time    ALKPHOSPHAT 59 09/27/2019 09:00 AM    ASTSGOT 23 09/27/2019 09:00 AM    ALTSGPT 19 09/27/2019 09:00 AM    TBILIRUBIN 0.5 09/27/2019 09:00 AM        Lab Results   Component Value Date/Time    WBC 7.9 09/27/2019 09:00 AM    RBC 4.72 09/27/2019 09:00 AM    HEMOGLOBIN 14.3 09/27/2019 09:00 AM    HEMATOCRIT 43.5 09/27/2019 09:00 AM    MCV 92.2 09/27/2019 09:00 AM    MCH 30.3 09/27/2019 09:00 AM    MCHC 32.9 (L) 09/27/2019 09:00 AM    MPV 9.8 09/27/2019 09:00 AM    NEUTSPOLYS 61.50 09/27/2019 09:00 AM    LYMPHOCYTES 30.40 09/27/2019 09:00 AM    MONOCYTES 6.60 09/27/2019 09:00 AM    EOSINOPHILS 0.90 09/27/2019 09:00 AM    BASOPHILS 0.50 09/27/2019 09:00 AM              Assessment and Plan. The following treatment and monitoring plan is recommended:   1. Annual physical exam  Recommend Pap smear every 2 to 3 years.  Monthly self breast exams.  Mammogram at age 42.    2. Need for influenza vaccination    - Influenza Vaccine Quad Injection (PF)    3. Migraine without aura and without status migrainosus, not intractable  She is getting these more frequently.  Trial of Topamax 25 mg twice daily she will keep a diary for 2 to 3 months in follow-up.  She may continue with Amerge for acute headaches.  She will try and avoid ibuprofen due to GI symptoms.  We also discussed rebound headaches and try not to take medications more than once a week.    4. Gastroesophageal reflux disease without esophagitis  She continues on omeprazole.    5. Recurrent major depressive disorder, in full remission (HCC)  Stable on present medications.  Follow-up with psychiatry.    6. Healthcare Maintenance. Counseled re: nutrition, activity and safety. Reviewed immunizations.     Follow up 3 months and prn.  ·

## 2019-10-14 RX ORDER — NARATRIPTAN 2.5 MG/1
TABLET ORAL
Qty: 18 TAB | Refills: 5 | Status: SHIPPED | OUTPATIENT
Start: 2019-10-14 | End: 2020-12-21 | Stop reason: SDUPTHER

## 2019-10-15 ENCOUNTER — PATIENT MESSAGE (OUTPATIENT)
Dept: INTERNAL MEDICINE | Facility: IMAGING CENTER | Age: 39
End: 2019-10-15

## 2019-10-15 RX ORDER — FLUCONAZOLE 150 MG/1
150 TABLET ORAL DAILY
Qty: 2 TAB | Refills: 0 | Status: SHIPPED | OUTPATIENT
Start: 2019-10-15 | End: 2020-01-23

## 2019-10-15 NOTE — TELEPHONE ENCOUNTER
From: Breanne Andrea  To: Caro Paz M.D.  Sent: 10/15/2019 10:44 AM PDT  Subject: Non-Urgent Medical Question    On 10/6/19, I went to the [facility] on [facility] as I woke up with a horrific sore throat with white spots and swollen lymph nodes. It was not strep according to the rapid test, but I was prescribed Cefuroxime Axetil 500MG tablets twice a day for 7 days. Throat is fine but now I have a yeast infection (it has been years since my last one). I picked up some Monistat 3 last night and I have used it before and know it works but I forgot about the excruciating pain and agony it causes while it does its thing. I was just wondering if an anti-fungal pill works just as well or if I just need to stay the course with the cream? Thank you for your time.

## 2019-11-13 ENCOUNTER — PATIENT MESSAGE (OUTPATIENT)
Dept: INTERNAL MEDICINE | Facility: IMAGING CENTER | Age: 39
End: 2019-11-13

## 2019-11-13 RX ORDER — NEOMYCIN SULFATE, POLYMYXIN B SULFATE AND HYDROCORTISONE 10; 3.5; 1 MG/ML; MG/ML; [USP'U]/ML
5 SUSPENSION/ DROPS AURICULAR (OTIC) 3 TIMES DAILY
Qty: 1 BOTTLE | Refills: 0 | Status: SHIPPED | OUTPATIENT
Start: 2019-11-13 | End: 2020-01-23

## 2019-11-13 NOTE — TELEPHONE ENCOUNTER
From: Breanne Andrea  To: Caro Paz M.D.  Sent: 11/13/2019 9:15 AM PST  Subject: Non-Urgent Medical Question    Good morning. My eczema has really flared up in the last few weeks and I have been using a combination of the steroid cream you prescribed to me awhile ago, hydro-cortisone, and lotion to try to keep it under control which has mostly been working. However, I am pretty sure the eczema has spread to my ear canal. The inside of my ears have been very itchy and almost painful over the last week or so and is of course made worse when I wear headphones. The outside of my ears look fine and are not itchy. It is only the inside. Is there anything I can use to calm the itch down in my ears? I assume I can buy an ear drop but I just wasn't sure what. Thank you.

## 2019-11-14 ENCOUNTER — OFFICE VISIT (OUTPATIENT)
Dept: INTERNAL MEDICINE | Facility: IMAGING CENTER | Age: 39
End: 2019-11-14
Payer: COMMERCIAL

## 2019-11-14 VITALS
HEART RATE: 87 BPM | TEMPERATURE: 97.6 F | RESPIRATION RATE: 14 BRPM | SYSTOLIC BLOOD PRESSURE: 126 MMHG | DIASTOLIC BLOOD PRESSURE: 70 MMHG | OXYGEN SATURATION: 97 %

## 2019-11-14 DIAGNOSIS — H61.21 IMPACTED CERUMEN OF RIGHT EAR: ICD-10-CM

## 2019-11-14 DIAGNOSIS — G43.009 MIGRAINE WITHOUT AURA AND WITHOUT STATUS MIGRAINOSUS, NOT INTRACTABLE: ICD-10-CM

## 2019-11-14 DIAGNOSIS — L30.9 CHRONIC ECZEMA: ICD-10-CM

## 2019-11-14 PROCEDURE — 99213 OFFICE O/P EST LOW 20 MIN: CPT | Performed by: FAMILY MEDICINE

## 2019-11-14 RX ORDER — TOPIRAMATE 50 MG/1
50 TABLET, FILM COATED ORAL 2 TIMES DAILY
Qty: 60 TAB | Refills: 3 | Status: SHIPPED | OUTPATIENT
Start: 2019-11-14 | End: 2020-03-09

## 2019-11-14 NOTE — PROGRESS NOTES
Chief Complaint   Patient presents with   • Ear Fullness     right ear   • Migraine     followup       HISTORY OF PRESENT ILLNESS: Patient is a 39 y.o. female established patient who presents today complaining that her right ear feels full and difficult to hear.  She wears earphones at work.  She had been having a flare of eczema and was having some itching in both ears.  She started using Cortisporin otic drops which is helped the itching but then the right ear became clogged.  She has never had problems with cerumen impaction in the past.  She otherwise feels well.    She does have chronic eczema and it seems to have taken a flare right now.  She has spots of dry scaly and itchy skin on her trunk and extremities.  She is using a good moisturizer and low-dose steroid cream intermittently.  She tried Eucrisa once on her face and it caused some burning.  She is willing to try it again.  No new medications.  The only thing different is that the furnace is on.    And lastly she is here to follow-up on migraines.  At her last visit we started Topamax and for the first 2 weeks she had no headaches.  But since then she is been having headaches at least half of the days.  She is had no side effects from the Topamax.  The headaches are pretty typical migraine or tension/migraine combinations.       Patient Active Problem List    Diagnosis Date Noted   • Migraine without aura and without status migrainosus, not intractable 10/02/2019   • Menstrual migraine without status migrainosus, not intractable 08/25/2017   • Chronic eczema    • Gastroesophageal reflux disease without esophagitis 07/29/2015   • Depression 02/18/2014         Past medical, surgical, family, and social history is reviewed and updated in Epic chart by me today.   Medications and allergies reviewed and updated in Epic chart by me today.     REVIEW OF SYSTEMS:  GENERAL: No fatigue, no weight loss.  HEENT:  Ears--as above                 Eyes--no blurred  vision, no discharge or pain                 Throat--No sore throat, no dysphagia, no hoarseness  CV:  No chest pain,dyspnea,palpitations or edema.  RESP:  No sob,cough,wheezing or hemoptysis.  GI: No dysphagia, heartburn,abdominal pain, nausea, vomiting, diarrhea or constipation.       No melena, jaundice, bleeding, incontinence or change in bowel habits.  :  No dysuria, polyuria, hematuria, incontinence, or nocturia.  MS:  No joint swelling, myalgias, or arthralgias.  NEURO:  No seizures, syncope, paralysis, tremor, or weakness. Migraines.   SKIN: eczema  PSYCH: Mood fine.    Vitals:    11/14/19 1400   BP: 126/70   Pulse: 87   Resp: 14   Temp: 36.4 °C (97.6 °F)   TempSrc: Temporal   SpO2: 97%     Physical Exam:  GENERAL;  WD/WN, No acute distress.  HEENT:  PERRLA, EOMI, no conjuctival injection, no icterus.                 External canals with mild erythema.  Right cerumen impaction.  Left tympanic membrane is normal.                 Nasal mucosa normal                 Pharynx clear.  No exudate.  NECK: Supple with no adenopathy or thyromegaly.  LUNGS: Clear with no rales, rhonchi, or wheezes.  COR: RR with no murmur, gallop or rub.  Skin: patches of dry, erythematous skin on trunk and extremities  EXT: No edema      Assessment/Plan:  1. Impacted cerumen of right ear.  Her right ear is irrigated by the nurse with successful removal of the impaction.  Tympanic membrane appears normal.    2. Chronic eczema.  Continue with moisturizer and occasional steroid.  She is given some samples of Eucrisa cream to try twice daily on trunk and extremities.    3. Migraine without aura and without status migrainosus, not intractable.  Increase Topamax to 50 mg twice daily.  Continue documenting. She will follow-up by email and 6 weeks.

## 2019-11-24 ENCOUNTER — HOSPITAL ENCOUNTER (EMERGENCY)
Facility: MEDICAL CENTER | Age: 39
End: 2019-11-24
Attending: EMERGENCY MEDICINE
Payer: COMMERCIAL

## 2019-11-24 VITALS
TEMPERATURE: 97.5 F | RESPIRATION RATE: 16 BRPM | WEIGHT: 158.73 LBS | BODY MASS INDEX: 26.45 KG/M2 | SYSTOLIC BLOOD PRESSURE: 134 MMHG | OXYGEN SATURATION: 98 % | HEART RATE: 83 BPM | DIASTOLIC BLOOD PRESSURE: 89 MMHG | HEIGHT: 65 IN

## 2019-11-24 DIAGNOSIS — S91.112A LACERATION OF LEFT GREAT TOE WITHOUT FOREIGN BODY PRESENT OR DAMAGE TO NAIL, INITIAL ENCOUNTER: ICD-10-CM

## 2019-11-24 PROCEDURE — 304217 HCHG IRRIGATION SYSTEM

## 2019-11-24 PROCEDURE — 700111 HCHG RX REV CODE 636 W/ 250 OVERRIDE (IP)

## 2019-11-24 PROCEDURE — 99283 EMERGENCY DEPT VISIT LOW MDM: CPT

## 2019-11-24 PROCEDURE — 700101 HCHG RX REV CODE 250: Performed by: EMERGENCY MEDICINE

## 2019-11-24 PROCEDURE — 90715 TDAP VACCINE 7 YRS/> IM: CPT

## 2019-11-24 PROCEDURE — 303747 HCHG EXTRA SUTURE

## 2019-11-24 PROCEDURE — 304999 HCHG REPAIR-SIMPLE/INTERMED LEVEL 1

## 2019-11-24 PROCEDURE — 90471 IMMUNIZATION ADMIN: CPT

## 2019-11-24 RX ORDER — LIDOCAINE HYDROCHLORIDE 10 MG/ML
20 INJECTION, SOLUTION INFILTRATION; PERINEURAL ONCE
Status: COMPLETED | OUTPATIENT
Start: 2019-11-24 | End: 2019-11-24

## 2019-11-24 RX ADMIN — LIDOCAINE HYDROCHLORIDE 20 ML: 10 INJECTION, SOLUTION INFILTRATION; PERINEURAL at 17:00

## 2019-11-24 RX ADMIN — CLOSTRIDIUM TETANI TOXOID ANTIGEN (FORMALDEHYDE INACTIVATED), CORYNEBACTERIUM DIPHTHERIAE TOXOID ANTIGEN (FORMALDEHYDE INACTIVATED), BORDETELLA PERTUSSIS TOXOID ANTIGEN (GLUTARALDEHYDE INACTIVATED), BORDETELLA PERTUSSIS FILAMENTOUS HEMAGGLUTININ ANTIGEN (FORMALDEHYDE INACTIVATED), BORDETELLA PERTUSSIS PERTACTIN ANTIGEN, AND BORDETELLA PERTUSSIS FIMBRIAE 2/3 ANTIGEN 0.5 ML: 5; 2; 2.5; 5; 3; 5 INJECTION, SUSPENSION INTRAMUSCULAR at 16:54

## 2019-11-25 NOTE — ED PROVIDER NOTES
ED Provider Note    CHIEF COMPLAINT   Chief Complaint   Patient presents with   • Laceration       HPI   Breanne Andrea is a 39 y.o. female who presents after stepping on a metal box and suffered a laceration to her right great toe.  Patient was in the garage wearing socks, when she stepped on a edge of a metal box.  She suffered a laceration to the bottom of her right great toe.  She did have some bleeding initially which has since resolved.  The patient did washout the wound at home before coming to the emergency department.  The patient denies any other injuries.  She is able to flex and extend the toe without difficulty.  She denies any other injuries.  She is not up-to-date on her tetanus.    REVIEW OF SYSTEMS   See HPI for further details. All other systems are negative.     PAST MEDICAL HISTORY   Past Medical History:   Diagnosis Date   • Anxiety    • Chronic eczema    • Depression 2/18/2014   • GERD (gastroesophageal reflux disease)        FAMILY HISTORY  Family History   Problem Relation Age of Onset   • Cancer Mother         anal cancer , liver mets   • Hypertension Mother    • Heart Disease Father    • Hyperlipidemia Father    • Cancer Paternal Grandmother         GI   • Stroke Maternal Grandmother    • Heart Disease Paternal Grandfather        SOCIAL HISTORY  Social History     Socioeconomic History   • Marital status:      Spouse name: Not on file   • Number of children: Not on file   • Years of education: Not on file   • Highest education level: Not on file   Occupational History   • Not on file   Social Needs   • Financial resource strain: Not on file   • Food insecurity:     Worry: Not on file     Inability: Not on file   • Transportation needs:     Medical: Not on file     Non-medical: Not on file   Tobacco Use   • Smoking status: Never Smoker   • Smokeless tobacco: Never Used   Substance and Sexual Activity   • Alcohol use: Yes     Alcohol/week: 1.5 oz     Types: 3 Glasses of wine per week  "    Comment: occ   • Drug use: No   • 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: , Accounting   Lifestyle   • Physical activity:     Days per week: Not on file     Minutes per session: Not on file   • Stress: Not on file   Relationships   • Social connections:     Talks on phone: Not on file     Gets together: Not on file     Attends Zoroastrian service: Not on file     Active member of club or organization: Not on file     Attends meetings of clubs or organizations: Not on file     Relationship status: Not on file   • Intimate partner violence:     Fear of current or ex partner: Not on file     Emotionally abused: Not on file     Physically abused: Not on file     Forced sexual activity: Not on file   Other Topics Concern   • Not on file   Social History Narrative   • Not on file       SURGICAL HISTORY  History reviewed. No pertinent surgical history.    CURRENT MEDICATIONS   Home Medications    **Home medications have not yet been reviewed for this encounter**         ALLERGIES   No Known Allergies    PHYSICAL EXAM  VITAL SIGNS: Blood Pressure 134/89   Pulse 83   Temperature 36.4 °C (97.5 °F) (Temporal)   Respiration 16   Height 1.651 m (5' 5\")   Weight 72 kg (158 lb 11.7 oz)   Oxygen Saturation 98%   Body Mass Index 26.41 kg/m²   Constitutional: Well developed, Well nourished, No acute distress, Non-toxic appearance.   Extremities: Exam is focused on the right foot.  There is a flap-like laceration over the volar surface of the distal phalanx of the right great toe measuring about 2 cm in length.  There is no active bleeding.  There is good range of motion on flexion/extension at the IP and MTP joints.  There is good sensation good cap refill to the toe.  There is no pain or tenderness noted to the rest of the foot or toes.         COURSE & MEDICAL DECISION MAKING  Pertinent Labs & Imaging studies reviewed. (See chart for details)  The patient presents with the above complaints.  She appears to have a " laceration over the volar surface of the right great toe.  The wound was cleaned, anesthetized with 1% lidocaine without epinephrine.  The wound was then explored with no foreign body noted.  There is a flap of skin over the wound.  The wound was irrigated with sterile saline solution.  The wound edges were then approximately closed using 4 interrupted sutures of 5-0 nylon.  Patient tolerated procedure well without complications.  Wound was dressed by nursing.  She was given a tetanus booster.  Patient was told to have the sutures removed in 7 days.  She is to return to the ER for any worsening pain, redness, swelling, discharge, fever, or any other problems.  She is to keep the foot clean and dry,  may shower, but avoid soaking the foot, or exposing the foot to other water sources such as hot tubs, pools, ponds.    FINAL IMPRESSION  1.  Laceration to the right great toe 2 cm repaired  2.   3.    Please note that this report has been corrected, injury occurred to the right great toe.  Electronically signed by: Yobany Mccann, 11/24/2019 9:07 PM

## 2019-11-25 NOTE — ED TRIAGE NOTES
"C/O right great toe laceration caused by walking over a metal box earlier this afternoon.  Chief Complaint   Patient presents with   • Laceration     /82   Pulse 85   Temp 36.5 °C (97.7 °F) (Temporal)   Resp 20   Ht 1.651 m (5' 5\")   Wt 72 kg (158 lb 11.7 oz)   SpO2 96%   BMI 26.41 kg/m²     "

## 2019-11-25 NOTE — ED NOTES
Discharge instructions provided.  Pt educated to come back in 7 days for suture removal, pt educated on s/s of infections and educated on what to do if these are present. Pt verbalized the understanding of discharge instructions to follow up with PCP and to return to ER if condition worsens.  Pt ambulated out of ER without difficulty.

## 2019-11-30 ENCOUNTER — HOSPITAL ENCOUNTER (EMERGENCY)
Facility: MEDICAL CENTER | Age: 39
End: 2019-11-30
Payer: COMMERCIAL

## 2019-11-30 VITALS
TEMPERATURE: 98.2 F | OXYGEN SATURATION: 96 % | DIASTOLIC BLOOD PRESSURE: 83 MMHG | HEIGHT: 65 IN | RESPIRATION RATE: 18 BRPM | BODY MASS INDEX: 26.45 KG/M2 | WEIGHT: 158.73 LBS | SYSTOLIC BLOOD PRESSURE: 125 MMHG | HEART RATE: 104 BPM

## 2019-11-30 PROCEDURE — 99281 EMR DPT VST MAYX REQ PHY/QHP: CPT

## 2019-12-01 NOTE — ED TRIAGE NOTES
"This is a  39 y.o. female who was seen in our department the 24 th of this month after stepping on a metal box causing a laceration to her right great toe.  She presents for suture removal.   Chief Complaint   Patient presents with   • Suture Removal     /83   Pulse (!) 104   Temp 36.8 °C (98.2 °F) (Temporal)   Resp 18   Ht 1.651 m (5' 5\")   Wt 72 kg (158 lb 11.7 oz)   SpO2 96%   BMI 26.41 kg/m²     "

## 2019-12-31 ENCOUNTER — NON-PROVIDER VISIT (OUTPATIENT)
Dept: INTERNAL MEDICINE | Facility: IMAGING CENTER | Age: 39
End: 2019-12-31
Payer: COMMERCIAL

## 2019-12-31 ENCOUNTER — HOSPITAL ENCOUNTER (OUTPATIENT)
Facility: MEDICAL CENTER | Age: 39
End: 2019-12-31
Attending: FAMILY MEDICINE
Payer: COMMERCIAL

## 2019-12-31 DIAGNOSIS — R30.0 DYSURIA: ICD-10-CM

## 2019-12-31 LAB
APPEARANCE UR: CLEAR
BILIRUB UR STRIP-MCNC: NEGATIVE MG/DL
COLOR UR AUTO: YELLOW
GLUCOSE UR STRIP.AUTO-MCNC: NEGATIVE MG/DL
KETONES UR STRIP.AUTO-MCNC: NEGATIVE MG/DL
LEUKOCYTE ESTERASE UR QL STRIP.AUTO: NORMAL
NITRITE UR QL STRIP.AUTO: NEGATIVE
PH UR STRIP.AUTO: 7 [PH] (ref 5–8)
PROT UR QL STRIP: NEGATIVE MG/DL
RBC UR QL AUTO: NEGATIVE
SP GR UR STRIP.AUTO: 1.01
UROBILINOGEN UR STRIP-MCNC: 0.2 MG/DL

## 2019-12-31 PROCEDURE — 81002 URINALYSIS NONAUTO W/O SCOPE: CPT | Performed by: FAMILY MEDICINE

## 2019-12-31 PROCEDURE — 87086 URINE CULTURE/COLONY COUNT: CPT

## 2020-01-02 ENCOUNTER — TELEPHONE (OUTPATIENT)
Dept: INTERNAL MEDICINE | Facility: IMAGING CENTER | Age: 40
End: 2020-01-02

## 2020-01-02 RX ORDER — AMOXICILLIN AND CLAVULANATE POTASSIUM 875; 125 MG/1; MG/1
1 TABLET, FILM COATED ORAL 2 TIMES DAILY
Qty: 10 TAB | Refills: 0 | Status: SHIPPED | OUTPATIENT
Start: 2020-01-02 | End: 2020-01-23

## 2020-01-02 NOTE — TELEPHONE ENCOUNTER
Discussed results of urine culture.  She is having symptoms.  Will treat with Augmentin twice daily for 5 days.  Push fluids and call for any concerns

## 2020-01-03 LAB
BACTERIA UR CULT: ABNORMAL
BACTERIA UR CULT: ABNORMAL
SIGNIFICANT IND 70042: ABNORMAL
SITE SITE: ABNORMAL
SOURCE SOURCE: ABNORMAL

## 2020-01-08 ENCOUNTER — PATIENT MESSAGE (OUTPATIENT)
Dept: INTERNAL MEDICINE | Facility: IMAGING CENTER | Age: 40
End: 2020-01-08

## 2020-01-08 RX ORDER — FLUCONAZOLE 150 MG/1
150 TABLET ORAL DAILY
Qty: 2 TAB | Refills: 0 | Status: SHIPPED | OUTPATIENT
Start: 2020-01-08 | End: 2020-01-23

## 2020-01-08 NOTE — TELEPHONE ENCOUNTER
From: Breanne Andrea  To: Caro Paz M.D.  Sent: 1/8/2020 9:06 AM PST  Subject: Non-Urgent Medical Question    Good Morning. The antibiotic for the bacterial infection has now caused a yeast infection (sigh...). May I please get a prescription sent in for the yeast infection? Thank you.

## 2020-01-23 ENCOUNTER — OFFICE VISIT (OUTPATIENT)
Dept: INTERNAL MEDICINE | Facility: IMAGING CENTER | Age: 40
End: 2020-01-23
Payer: COMMERCIAL

## 2020-01-23 VITALS
WEIGHT: 165 LBS | OXYGEN SATURATION: 97 % | BODY MASS INDEX: 27.49 KG/M2 | TEMPERATURE: 97.7 F | DIASTOLIC BLOOD PRESSURE: 70 MMHG | RESPIRATION RATE: 14 BRPM | SYSTOLIC BLOOD PRESSURE: 120 MMHG | HEART RATE: 75 BPM | HEIGHT: 65 IN

## 2020-01-23 DIAGNOSIS — R05.9 COUGH: ICD-10-CM

## 2020-01-23 PROCEDURE — 99213 OFFICE O/P EST LOW 20 MIN: CPT | Performed by: FAMILY MEDICINE

## 2020-01-23 RX ORDER — POLYETHYLENE GLYCOL 3350 17 G/17G
17 POWDER, FOR SOLUTION ORAL DAILY
COMMUNITY
End: 2021-07-15

## 2020-01-23 RX ORDER — PREDNISONE 10 MG/1
TABLET ORAL
Qty: 21 TAB | Refills: 0 | Status: SHIPPED | OUTPATIENT
Start: 2020-01-23 | End: 2020-06-22

## 2020-01-23 NOTE — PROGRESS NOTES
Chief Complaint   Patient presents with   • Cough       HISTORY OF PRESENT ILLNESS: Patient is a 39 y.o. female established patient who presents today complaining of a cough for the past 2 months.  She started with an upper respiratory infection in December.  She said she had head congestion, runny nose, sore throat.  That all slowly resolved over a couple of weeks.  Now she is left with a cough.  It is mostly a dry cough.  It feels like it is in her upper chest.  It is worse when she does a lot of talking.  Worse at night.  She does have reflux but states that is very well controlled on omeprazole.  This does not feel like her reflux.  No new allergies.  She has tried over-the-counter cough suppressants and it does help short-term.  No smoking.  No shortness of breath.  No fevers or chills.  Otherwise she feels well.      Patient Active Problem List    Diagnosis Date Noted   • Migraine without aura and without status migrainosus, not intractable 10/02/2019   • Menstrual migraine without status migrainosus, not intractable 08/25/2017   • Chronic eczema    • Gastroesophageal reflux disease without esophagitis 07/29/2015   • Depression 02/18/2014     Current Outpatient Medications on File Prior to Visit   Medication Sig Dispense Refill   • polyethylene glycol/lytes (MIRALAX) Pack Take 17 g by mouth every day.     • topiramate (TOPAMAX) 50 MG tablet Take 1 Tab by mouth 2 times a day. 60 Tab 3   • naratriptan (AMERGE) 2.5 MG tablet TAKE 1 TABLET BY MOUTH ONCE AS NEEDED FOR MIGRAINE FOR UP TO 1 DOSE. MAY REPEAT IN 2 HOURS IF NEEDED. MAX OF 2 DOSES IN 24 HOURS 18 Tab 5   • buPROPion (WELLBUTRIN SR) 200 MG SR tablet Take 200 mg by mouth every day.     • KARIVA 0.15-0.02/0.01 MG (21/5) per tablet TAKE 1 ACTIVE TABLET FOR 84 DAYS STRAIGHT THEN TAKE INACTIVE TABLETS FOR 7 DAYS AS DIRECTED 112 Tab 2   • ALPRAZolam (XANAX) 0.25 MG Tab TAKE 1 TABLET BY MOUTH DAILY AS NEEDED FOR 30 DAYS  1   • TRINTELLIX 20 MG Tab Take 20 mg by  "mouth every day. TAKE 1 TABLET BY MOUTH DAILY  2   • vitamin D (CHOLECALCIFEROL) 1000 UNIT Tab Take 2,000 Units by mouth every day.     • glucosamine Sulfate 500 MG Cap Take 500 mg by mouth 3 times a day, with meals.     • docusate calcium (SURFAK) 240 MG Cap Take 240 mg by mouth 2 times a day.     • Psyllium (METAMUCIL FIBER PO) Take  by mouth.     • omeprazole (PRILOSEC) 20 MG CPDR Take 20 mg by mouth every day.     • triamcinolone acetonide (KENALOG) 0.1 % Cream Apply to affected areas twice daily. 60 g 1     No current facility-administered medications on file prior to visit.          Past medical, surgical, family, and social history is reviewed and updated in Epic chart by me today.   Medications and allergies reviewed and updated in Epic chart by me today.     REVIEW OF SYSTEMS:  GENERAL: No fatigue, no weight loss.  HEENT:  Ears--no earache, no change in hearing, no dizziness, no tinnitus.                 Eyes--no blurred vision, no discharge or pain                 Throat--No sore throat, no dysphagia.  She does have a little hoarseness intermittently.  CV:  No chest pain,dyspnea,palpitations or edema.  RESP: Cough as above  GI: No dysphagia, heartburn,abdominal pain, nausea, vomiting, diarrhea or constipation.       No melena, jaundice, bleeding, incontinence or change in bowel habits.  :  No dysuria, polyuria, hematuria, incontinence, or nocturia.  MS:  No joint swelling, myalgias, or arthralgias.  NEURO:  No seizures, syncope, paralysis, tremor, or weakness.  SKIN: No new or concerning skin lesions or changes.   PSYCH: Mood fine.    Vitals:    01/23/20 0800   BP: 120/70   Pulse: 75   Resp: 14   Temp: 36.5 °C (97.7 °F)   TempSrc: Temporal   SpO2: 97%   Weight: 74.8 kg (165 lb)   Height: 1.651 m (5' 5\")     Physical Exam:  GENERAL;  WD/WN, No acute distress.  HEENT:  PERRLA, EOMI, no conjuctival injection, no icterus.                 TM's normal bilat.                 Nasal mucosa erythematous and " edematous.                 Pharynx injected. No exudate.  NECK: Supple with no adenopathy or thyromegaly.  LUNGS: Clear with no rales, rhonchi, or wheezes.  COR: RR with no murmur, gallop or rub.  EXT: No edema.      Assessment/Plan:  1.  Cough.  Suspect this is more inflammatory.  Recommend prednisone 20 mg daily for 1 week, 10 mg daily for 1 week.  She is to call if her cough persists.

## 2020-03-09 RX ORDER — TOPIRAMATE 50 MG/1
TABLET, FILM COATED ORAL
Qty: 180 TAB | Refills: 1 | Status: SHIPPED | OUTPATIENT
Start: 2020-03-09 | End: 2020-09-14

## 2020-03-31 RX ORDER — DESOGESTREL AND ETHINYL ESTRADIOL AND ETHINYL ESTRADIOL 21-5 (28)
KIT ORAL
Qty: 112 TAB | Refills: 3 | Status: SHIPPED | OUTPATIENT
Start: 2020-03-31 | End: 2021-06-15 | Stop reason: SDUPTHER

## 2020-06-22 ENCOUNTER — OFFICE VISIT (OUTPATIENT)
Dept: INTERNAL MEDICINE | Facility: IMAGING CENTER | Age: 40
End: 2020-06-22
Payer: COMMERCIAL

## 2020-06-22 VITALS
OXYGEN SATURATION: 93 % | HEART RATE: 96 BPM | TEMPERATURE: 98.8 F | HEIGHT: 65 IN | RESPIRATION RATE: 14 BRPM | SYSTOLIC BLOOD PRESSURE: 128 MMHG | DIASTOLIC BLOOD PRESSURE: 70 MMHG | BODY MASS INDEX: 28.16 KG/M2 | WEIGHT: 169 LBS

## 2020-06-22 DIAGNOSIS — K92.0 HEMATEMESIS, PRESENCE OF NAUSEA NOT SPECIFIED: ICD-10-CM

## 2020-06-22 PROCEDURE — 99213 OFFICE O/P EST LOW 20 MIN: CPT | Performed by: FAMILY MEDICINE

## 2020-06-22 RX ORDER — PANTOPRAZOLE SODIUM 40 MG/1
40 TABLET, DELAYED RELEASE ORAL DAILY
Qty: 30 TAB | Refills: 1 | Status: SHIPPED | OUTPATIENT
Start: 2020-06-22 | End: 2020-07-14

## 2020-06-22 ASSESSMENT — FIBROSIS 4 INDEX: FIB4 SCORE: 0.59

## 2020-06-28 NOTE — PROGRESS NOTES
Chief Complaint   Patient presents with   • Epigastric Pain   • Emesis     coffee ground        HISTORY OF PRESENT ILLNESS: Patient is a 40 y.o. female established patient who presents today complaining of 2 episodes of hematemesis that occurred 2 days ago.  She admits she was drinking too much.  She became nauseous and vomited.  In the vomit was some coffee-ground emesis.  No bright red blood.  She had not been eating much.  Over the next 48 hours she did have a headache, symptoms of a hangover and some diarrhea.  She is feeling better today.  She is drinking fluids well.  She has no abdominal pain no further nausea treatment she has been drinking a little more during the pandemic and spending time at home.  Alcohol is never been a problem for her.  States she is been drinking about 3 times a week but does not drink excessively bruising is happened.  Her bowels have been normal.  She does tend toward constipation.  She has had no melena or blood in her stools.  She did have a couple episodes of diarrhea which have resolved.  Again no blood or black.  No abdominal pain.  She is on omeprazole which she has been taking sporadically.      Patient Active Problem List    Diagnosis Date Noted   • Migraine without aura and without status migrainosus, not intractable 10/02/2019   • Menstrual migraine without status migrainosus, not intractable 08/25/2017   • Chronic eczema    • Gastroesophageal reflux disease without esophagitis 07/29/2015   • Depression 02/18/2014     Current Outpatient Medications on File Prior to Visit   Medication Sig Dispense Refill   • VIORELE 0.15-0.02/0.01 MG (21/5) per tablet TAKE 1 ACTIVE TABLET FOR 84 DAYS STRAIGHT THEN TAKE INACTIVE TABLETS FOR 7 DAYS AS DIRECTED 112 Tab 3   • topiramate (TOPAMAX) 50 MG tablet TAKE 1 TABLET BY MOUTH TWICE A  Tab 1   • polyethylene glycol/lytes (MIRALAX) Pack Take 17 g by mouth every day.     • naratriptan (AMERGE) 2.5 MG tablet TAKE 1 TABLET BY MOUTH ONCE  "AS NEEDED FOR MIGRAINE FOR UP TO 1 DOSE. MAY REPEAT IN 2 HOURS IF NEEDED. MAX OF 2 DOSES IN 24 HOURS 18 Tab 5   • buPROPion (WELLBUTRIN SR) 200 MG SR tablet Take 200 mg by mouth every day.     • ALPRAZolam (XANAX) 0.25 MG Tab TAKE 1 TABLET BY MOUTH DAILY AS NEEDED FOR 30 DAYS  1   • TRINTELLIX 20 MG Tab Take 20 mg by mouth every day. TAKE 1 TABLET BY MOUTH DAILY  2   • vitamin D (CHOLECALCIFEROL) 1000 UNIT Tab Take 2,000 Units by mouth every day.     • docusate calcium (SURFAK) 240 MG Cap Take 240 mg by mouth 2 times a day.     • Psyllium (METAMUCIL FIBER PO) Take  by mouth.     • omeprazole (PRILOSEC) 20 MG CPDR Take 20 mg by mouth every day.     • triamcinolone acetonide (KENALOG) 0.1 % Cream Apply to affected areas twice daily. 60 g 1     No current facility-administered medications on file prior to visit.          Past medical, surgical, family, and social history is reviewed and updated in Epic chart by me today.   Medications and allergies reviewed and updated in Epic chart by me today.     REVIEW OF SYSTEMS:  GENERAL: No fatigue, she has gained a few pounds during the pandemic  HEENT:  Ears--no earache, no change in hearing, no dizziness, no tinnitus.                 Eyes--no blurred vision, no discharge or pain                 Throat--No sore throat, no dysphagia, no hoarseness  CV:  No chest pain,dyspnea,palpitations or edema.  RESP:  No sob,cough,wheezing or hemoptysis.  GI: as above  :  No dysuria, polyuria, hematuria, incontinence, or nocturia.  MS:  No joint swelling, myalgias, or arthralgias.  NEURO:  No seizures, syncope, paralysis, tremor, or weakness.  SKIN: No new or concerning skin lesions or changes.   PSYCH: Mood fine.    Vitals:    06/22/20 1400   BP: 128/70   Pulse: 96   Resp: 14   Temp: 37.1 °C (98.8 °F)   TempSrc: Temporal   SpO2: 93%   Weight: 76.7 kg (169 lb)   Height: 1.651 m (5' 5\")       Physical Exam:  Gen: Well developed, well nourished. No acute distress.  Neck:  Supple, no " adenopathy or thyromegaly.  Heart:  Regular rate and rhythm.  Normal S1, S2. No murmur, gallop or rub.  Lungs:  Clear, No wheezes,rales or rhonchi.  Abdomen: Soft, nontender, nondistended. Normal bowel sounds. No hepatosplenomegaly or masses, or hernias. No rebound or guarding.  Extremities:  No edema.  Psych: Mood and affect are appropriate.      Assessment/Plan:  1. Hematemesis, presence of nausea not specified     This is most likely secondary to excessive alcohol and gastritis.  We will have her take Protonix 40 mg daily for 1 month.  Abstain from alcohol.  Call for any further symptoms.

## 2020-07-14 RX ORDER — PANTOPRAZOLE SODIUM 40 MG/1
TABLET, DELAYED RELEASE ORAL
Qty: 30 TAB | Refills: 1 | Status: SHIPPED | OUTPATIENT
Start: 2020-07-14 | End: 2020-08-03

## 2020-08-03 RX ORDER — PANTOPRAZOLE SODIUM 40 MG/1
TABLET, DELAYED RELEASE ORAL
Qty: 90 TAB | Refills: 1 | Status: SHIPPED | OUTPATIENT
Start: 2020-08-03 | End: 2020-08-17

## 2020-08-17 ENCOUNTER — HOSPITAL ENCOUNTER (OUTPATIENT)
Facility: MEDICAL CENTER | Age: 40
End: 2020-08-17
Attending: FAMILY MEDICINE
Payer: COMMERCIAL

## 2020-08-17 ENCOUNTER — OFFICE VISIT (OUTPATIENT)
Dept: INTERNAL MEDICINE | Facility: IMAGING CENTER | Age: 40
End: 2020-08-17
Payer: COMMERCIAL

## 2020-08-17 VITALS
HEART RATE: 82 BPM | OXYGEN SATURATION: 98 % | HEIGHT: 65 IN | SYSTOLIC BLOOD PRESSURE: 116 MMHG | DIASTOLIC BLOOD PRESSURE: 74 MMHG | TEMPERATURE: 97.5 F | RESPIRATION RATE: 14 BRPM | BODY MASS INDEX: 28.12 KG/M2

## 2020-08-17 DIAGNOSIS — R30.0 DYSURIA: ICD-10-CM

## 2020-08-17 DIAGNOSIS — N76.0 ACUTE VAGINITIS: ICD-10-CM

## 2020-08-17 LAB
APPEARANCE UR: CLEAR
BILIRUB UR STRIP-MCNC: NEGATIVE MG/DL
COLOR UR AUTO: YELLOW
GLUCOSE UR STRIP.AUTO-MCNC: NEGATIVE MG/DL
KETONES UR STRIP.AUTO-MCNC: NEGATIVE MG/DL
LEUKOCYTE ESTERASE UR QL STRIP.AUTO: NEGATIVE
NITRITE UR QL STRIP.AUTO: NEGATIVE
PH UR STRIP.AUTO: 6 [PH] (ref 5–8)
PROT UR QL STRIP: NEGATIVE MG/DL
RBC UR QL AUTO: NEGATIVE
SP GR UR STRIP.AUTO: 1.01
UROBILINOGEN UR STRIP-MCNC: 0.2 MG/DL

## 2020-08-17 PROCEDURE — 81002 URINALYSIS NONAUTO W/O SCOPE: CPT | Performed by: FAMILY MEDICINE

## 2020-08-17 PROCEDURE — 87480 CANDIDA DNA DIR PROBE: CPT

## 2020-08-17 PROCEDURE — 99213 OFFICE O/P EST LOW 20 MIN: CPT | Performed by: FAMILY MEDICINE

## 2020-08-17 PROCEDURE — 87510 GARDNER VAG DNA DIR PROBE: CPT

## 2020-08-17 PROCEDURE — 87660 TRICHOMONAS VAGIN DIR PROBE: CPT

## 2020-08-17 RX ORDER — FLUCONAZOLE 150 MG/1
150 TABLET ORAL DAILY
Qty: 2 TAB | Refills: 0 | Status: SHIPPED | OUTPATIENT
Start: 2020-08-17 | End: 2021-03-31

## 2020-08-18 LAB
CANDIDA DNA VAG QL PROBE+SIG AMP: POSITIVE
G VAGINALIS DNA VAG QL PROBE+SIG AMP: POSITIVE
T VAGINALIS DNA VAG QL PROBE+SIG AMP: NEGATIVE

## 2020-08-19 RX ORDER — CLINDAMYCIN PHOSPHATE 20 MG/G
1 CREAM VAGINAL
Qty: 40 G | Refills: 0 | Status: SHIPPED | OUTPATIENT
Start: 2020-08-19 | End: 2021-03-31

## 2020-08-19 NOTE — PROGRESS NOTES
Chief Complaint   Patient presents with   • Vaginitis     discharge x 2 weeks       HISTORY OF PRESENT ILLNESS: Patient is a 40 y.o. female established patient who presents today complaining of about 2 weeks of vaginal discharge.  She describes it as clumpy, mostly white, a little odor.  She is in a monogamous relationship.  She has had a little burning with urination but only after intercourse.  She is drinking fluids well.  There is been no blood.  No fevers or chills.  No pelvic pain.      Patient Active Problem List    Diagnosis Date Noted   • Migraine without aura and without status migrainosus, not intractable 10/02/2019   • Menstrual migraine without status migrainosus, not intractable 08/25/2017   • Chronic eczema    • Gastroesophageal reflux disease without esophagitis 07/29/2015   • Depression 02/18/2014     Current Outpatient Medications on File Prior to Visit   Medication Sig Dispense Refill   • VIORELE 0.15-0.02/0.01 MG (21/5) per tablet TAKE 1 ACTIVE TABLET FOR 84 DAYS STRAIGHT THEN TAKE INACTIVE TABLETS FOR 7 DAYS AS DIRECTED 112 Tab 3   • topiramate (TOPAMAX) 50 MG tablet TAKE 1 TABLET BY MOUTH TWICE A  Tab 1   • polyethylene glycol/lytes (MIRALAX) Pack Take 17 g by mouth every day.     • naratriptan (AMERGE) 2.5 MG tablet TAKE 1 TABLET BY MOUTH ONCE AS NEEDED FOR MIGRAINE FOR UP TO 1 DOSE. MAY REPEAT IN 2 HOURS IF NEEDED. MAX OF 2 DOSES IN 24 HOURS 18 Tab 5   • buPROPion (WELLBUTRIN SR) 200 MG SR tablet Take 200 mg by mouth every day.     • ALPRAZolam (XANAX) 0.25 MG Tab TAKE 1 TABLET BY MOUTH DAILY AS NEEDED FOR 30 DAYS  1   • TRINTELLIX 20 MG Tab Take 20 mg by mouth every day. TAKE 1 TABLET BY MOUTH DAILY  2   • vitamin D (CHOLECALCIFEROL) 1000 UNIT Tab Take 2,000 Units by mouth every day.     • docusate calcium (SURFAK) 240 MG Cap Take 240 mg by mouth 2 times a day.     • Psyllium (METAMUCIL FIBER PO) Take  by mouth.     • omeprazole (PRILOSEC) 20 MG CPDR Take 20 mg by mouth every day.    "  • triamcinolone acetonide (KENALOG) 0.1 % Cream Apply to affected areas twice daily. 60 g 1     No current facility-administered medications on file prior to visit.            Past medical, surgical, family, and social history is reviewed and updated in Epic chart by me today.   Medications and allergies reviewed and updated in Epic chart by me today.     REVIEW OF SYSTEMS:  GENERAL: No fatigue, no weight loss.  CV:  No chest pain,dyspnea,palpitations or edema.  RESP:  No sob,cough,wheezing or hemoptysis.  GI: No dysphagia, heartburn,abdominal pain, nausea, vomiting, diarrhea or constipation.       No melena, jaundice, bleeding, incontinence or change in bowel habits.  :  As above  MS:  No joint swelling, myalgias, or arthralgias.  NEURO:  No seizures, syncope, paralysis, tremor, or weakness.  SKIN: No new or concerning skin lesions or changes.   PSYCH: Mood fine.    Vitals:    08/17/20 1500   BP: 116/74   Pulse: 82   Resp: 14   Temp: 36.4 °C (97.5 °F)   TempSrc: Temporal   SpO2: 98%   Height: 1.651 m (5' 5\")     Physical Exam:  Gen: Well developed, well nourished. No acute distress.  Neck:  Supple, no adenopathy or thyromegaly.  Heart:  Regular rate and rhythm.  Normal S1, S2. No murmur, gallop or rub.  Lungs:  Clear, No wheezes,rales or rhonchi.  Abdomen: Soft, nontender, nondistended. Normal bowel sounds. No hepatosplenomegaly or masses, or hernias. No rebound or guarding.  : external genitalia normal with no lesion. Vagina with no lesions.  She does have a white discharge consistent with yeast.  Cervix nontender with no lesion. Uterus nontender and normal size. Adnexa nontender with no mass.   Extremities:  No edema.  Psych: Mood and affect are appropriate.      Assessment/Plan:  1. Acute vaginitis.  Will treat with Diflucan for 2 days.  Did send sample for vaginal pathogens.  She is to call for any worsening of her symptoms. VAGINAL PATHOGENS DNA PANEL   2. Dysuria.  Urinalysis is normal. POCT Urinalysis "

## 2020-09-14 RX ORDER — TOPIRAMATE 50 MG/1
TABLET, FILM COATED ORAL
Qty: 180 TAB | Refills: 1 | Status: SHIPPED | OUTPATIENT
Start: 2020-09-14 | End: 2021-03-19 | Stop reason: SDUPTHER

## 2020-12-21 ENCOUNTER — PATIENT MESSAGE (OUTPATIENT)
Dept: INTERNAL MEDICINE | Facility: IMAGING CENTER | Age: 40
End: 2020-12-21

## 2020-12-21 RX ORDER — NARATRIPTAN 2.5 MG/1
TABLET ORAL
Qty: 18 TAB | Refills: 3 | Status: SHIPPED | OUTPATIENT
Start: 2020-12-21 | End: 2021-09-01 | Stop reason: SDUPTHER

## 2020-12-21 NOTE — TELEPHONE ENCOUNTER
From: Breanne Andrea  To: Caro Paz M.D.  Sent: 12/21/2020 9:13 AM PST  Subject: Prescription Question    Good Morning,    I need to request a refill of the Naratriptan 2.5 MG Tablets. Normally I can do this through the [facility] but starting 12/1/2020 my insurance switched to [employer] and it no longer has that prescription on file. Is this something that can be done? Or can it be sent to the [facility] pharmacy on [address]? Just trying to get a refill before the end of the year before the deductible starts over. :)     Thank you.    Breanne GONZALEZ Super bummed you are retiring but congratulations and hope have a happy and healthy one.

## 2021-03-19 RX ORDER — TOPIRAMATE 50 MG/1
TABLET, FILM COATED ORAL
Qty: 180 TABLET | Refills: 0 | Status: SHIPPED | OUTPATIENT
Start: 2021-03-19 | End: 2021-06-14

## 2021-03-29 ENCOUNTER — NON-PROVIDER VISIT (OUTPATIENT)
Dept: INTERNAL MEDICINE | Facility: IMAGING CENTER | Age: 41
End: 2021-03-29
Payer: COMMERCIAL

## 2021-03-29 ENCOUNTER — HOSPITAL ENCOUNTER (OUTPATIENT)
Facility: MEDICAL CENTER | Age: 41
End: 2021-03-29
Attending: FAMILY MEDICINE
Payer: COMMERCIAL

## 2021-03-29 DIAGNOSIS — R30.0 DYSURIA: ICD-10-CM

## 2021-03-29 PROCEDURE — 87086 URINE CULTURE/COLONY COUNT: CPT

## 2021-03-29 RX ORDER — NITROFURANTOIN 25; 75 MG/1; MG/1
100 CAPSULE ORAL 2 TIMES DAILY
Qty: 14 CAPSULE | Refills: 0 | Status: SHIPPED | OUTPATIENT
Start: 2021-03-29 | End: 2021-03-31

## 2021-03-31 ENCOUNTER — OFFICE VISIT (OUTPATIENT)
Dept: INTERNAL MEDICINE | Facility: IMAGING CENTER | Age: 41
End: 2021-03-31
Payer: COMMERCIAL

## 2021-03-31 VITALS
DIASTOLIC BLOOD PRESSURE: 70 MMHG | HEIGHT: 65 IN | WEIGHT: 171 LBS | BODY MASS INDEX: 28.49 KG/M2 | SYSTOLIC BLOOD PRESSURE: 105 MMHG | OXYGEN SATURATION: 95 % | TEMPERATURE: 97.8 F | HEART RATE: 91 BPM | RESPIRATION RATE: 14 BRPM

## 2021-03-31 DIAGNOSIS — R19.7 DIARRHEA, UNSPECIFIED TYPE: ICD-10-CM

## 2021-03-31 DIAGNOSIS — G43.009 MIGRAINE WITHOUT AURA AND WITHOUT STATUS MIGRAINOSUS, NOT INTRACTABLE: ICD-10-CM

## 2021-03-31 DIAGNOSIS — N30.00 ACUTE CYSTITIS WITHOUT HEMATURIA: ICD-10-CM

## 2021-03-31 DIAGNOSIS — Z76.89 ESTABLISHING CARE WITH NEW DOCTOR, ENCOUNTER FOR: ICD-10-CM

## 2021-03-31 DIAGNOSIS — F33.40 RECURRENT MAJOR DEPRESSIVE DISORDER, IN REMISSION (HCC): ICD-10-CM

## 2021-03-31 DIAGNOSIS — K21.9 GASTROESOPHAGEAL REFLUX DISEASE WITHOUT ESOPHAGITIS: ICD-10-CM

## 2021-03-31 PROCEDURE — 99215 OFFICE O/P EST HI 40 MIN: CPT | Performed by: FAMILY MEDICINE

## 2021-03-31 RX ORDER — BUPROPION HYDROCHLORIDE 300 MG/1
300 TABLET ORAL EVERY MORNING
COMMUNITY
End: 2021-07-09 | Stop reason: SDUPTHER

## 2021-03-31 SDOH — HEALTH STABILITY: PHYSICAL HEALTH: ON AVERAGE, HOW MANY DAYS PER WEEK DO YOU ENGAGE IN MODERATE TO STRENUOUS EXERCISE (LIKE A BRISK WALK)?: 5 DAYS

## 2021-03-31 SDOH — HEALTH STABILITY: PHYSICAL HEALTH: ON AVERAGE, HOW MANY MINUTES DO YOU ENGAGE IN EXERCISE AT THIS LEVEL?: 40 MIN

## 2021-03-31 ASSESSMENT — PATIENT HEALTH QUESTIONNAIRE - PHQ9
9. THOUGHTS THAT YOU WOULD BE BETTER OFF DEAD, OR OF HURTING YOURSELF: NOT AT ALL
6. FEELING BAD ABOUT YOURSELF - OR THAT YOU ARE A FAILURE OR HAVE LET YOURSELF OR YOUR FAMILY DOWN: NOT AL ALL
8. MOVING OR SPEAKING SO SLOWLY THAT OTHER PEOPLE COULD HAVE NOTICED. OR THE OPPOSITE, BEING SO FIGETY OR RESTLESS THAT YOU HAVE BEEN MOVING AROUND A LOT MORE THAN USUAL: NOT AT ALL
5. POOR APPETITE OR OVEREATING: NOT AT ALL
SUM OF ALL RESPONSES TO PHQ QUESTIONS 1-9: 0
7. TROUBLE CONCENTRATING ON THINGS, SUCH AS READING THE NEWSPAPER OR WATCHING TELEVISION: NOT AT ALL
4. FEELING TIRED OR HAVING LITTLE ENERGY: NOT AT ALL
2. FEELING DOWN, DEPRESSED, IRRITABLE, OR HOPELESS: NOT AT ALL
SUM OF ALL RESPONSES TO PHQ9 QUESTIONS 1 AND 2: 0
3. TROUBLE FALLING OR STAYING ASLEEP OR SLEEPING TOO MUCH: NOT AT ALL
1. LITTLE INTEREST OR PLEASURE IN DOING THINGS: NOT AT ALL

## 2021-03-31 ASSESSMENT — FIBROSIS 4 INDEX: FIB4 SCORE: 0.6

## 2021-03-31 NOTE — ASSESSMENT & PLAN NOTE
Managed by psychiatry and psychology   she is currently doing well on Trintellix and Wellbutrin, as needed Xanax for anxiety

## 2021-03-31 NOTE — ASSESSMENT & PLAN NOTE
She has been stable  with omeprazole  Would like to switch her over to a trial of Pepcid  Patient agrees to switch

## 2021-03-31 NOTE — PROGRESS NOTES
Chief Complaint   Patient presents with   • Diarrhea     off and on since COVID infection   • Establish Care       Subjective:     HPI:   Breanne Andrea is a 41 y.o. female here to establish care and to discuss the evaluation and management of:       Dysuria and frequency-she started having symptoms on the 27th - denies hematuria. currently  She called us on the 29th and urine culture was ordered  She was started on Macrobid and reports her symptoms have improved she has had history of 2 or 3 infections in the past  No history of pyelonephritis      Problem   Frequent Diarrhea    Patient reports she had Covid infection in January her only symptoms were lost of smell and diarrhea  She reports she will still have intermittent diarrhea after eating  It will be preceded by cramping which can get to the level of 6 or 7 out of 10  Her normal bowel patterns lean towards constipation most of the time  She believes it was Covid but her test was negative and she felt she might have tested too early       Migraine Without Aura and Without Status Migrainosus, Not Intractable-history of taking a triptan   Gastroesophageal Reflux Disease Without Esophagitis-has been managing well with omeprazole   Recurrent Major Depressive Disorder, in Remission ([facility])    She reports she has had depression since her 20s  There is a strong family history of depression in both parents she believes  She is proactive and sees a therapist named Lauren Mccormack  She feels better when she goes to see her  In addition her medications are managed by Dr. Nelli Cm whom she has been seeing for 3 years also.  Dr. Cm orders the Trintellix and Wellbutrin  She also prescribes her as needed Xanax which she ends up using about once or twice a week and anxiety provoking situations          In terms of lifestyle patient walks her dogs about 5 days a week and eats a regular diet     Objective:     /70   Pulse 91   Temp 36.6 °C (97.8 °F) (Temporal)    "Resp 14   Ht 1.651 m (5' 5\")   Wt 77.6 kg (171 lb)   SpO2 95%  Body mass index is 28.46 kg/m².    Physical Exam:  Physical Exam  Constitutional: Well-developed and well-nourished. Not diaphoretic. No distress.   Skin: Skin is warm and dry. No rash noted.  Head: Atraumatic without lesions.  Eyes: Conjunctivae and extraocular motions are normal.   Ears:  External ears unremarkable.    Nose: Nares patent. Mucosa without edema or erythema. No discharge. No facial tenderness.     Neck: Supple, No thyromegaly present. No JVD  Cardiovascular: Regular rate and rhythm.   Chest: Effort normal. Clear to auscultation throughout. No adventitious sounds.   Abdomen:  without distention.  .  Extremities: No cyanosis, clubbing, erythema, nor edema.   Neurological: Alert and oriented x 3.    Psychiatric:  Behavior, mood, and affect are appropriate     Assessment and Plan:     The following treatment plan was discussed/researched:      Acute cystitis-currently on Macrobid pending urine culture results  Appears to be improving symptomatically      Recurrent major depressive disorder, in remission (HCC)  Managed by psychiatry and psychology   she is currently doing well on Trintellix and Wellbutrin, as needed Xanax for anxiety    Migraine without aura and without status migrainosus, not intractable  Naratriptan 2.5 MG Tablets    Gastroesophageal reflux disease without esophagitis  She has been stable  with omeprazole  Would like to switch her over to a trial of Pepcid  Patient agrees to switch    Frequent diarrhea  We will check stool studies  Referral to colonoscopy as she does have a family history of colon cancer as well                                                                                                                                                                                 Any change or worsening of signs or symptoms, patient encouraged to follow-up or report to emergency room for further evaluation. Patient " verbalizes understanding and agrees.    Follow-Up: For annual exam    PLEASE NOTE: This dictation was created using voice recognition software. I have made every reasonable attempt to correct obvious errors, but I expect that there are errors of grammar and possibly content that I did not discover before finalizing the note.      My total time spent caring for the patient on the day of the encounter was  greater than 40 minutes.   This includes obtaining history, reviewing chart, physical exam, patient education, reviewing outside records, placing orders, interpreting tests and coordinating care.

## 2021-03-31 NOTE — ASSESSMENT & PLAN NOTE
We will check stool studies  Referral to colonoscopy as she does have a family history of colon cancer as well

## 2021-04-01 LAB
BACTERIA UR CULT: NORMAL
SIGNIFICANT IND 70042: NORMAL
SITE SITE: NORMAL
SOURCE SOURCE: NORMAL

## 2021-06-14 RX ORDER — TOPIRAMATE 50 MG/1
TABLET, FILM COATED ORAL
Qty: 180 TABLET | Refills: 3 | Status: SHIPPED | OUTPATIENT
Start: 2021-06-14 | End: 2022-06-22

## 2021-06-15 RX ORDER — DESOGESTREL AND ETHINYL ESTRADIOL 21-5 (28)
KIT ORAL
Qty: 112 TABLET | Refills: 3 | Status: SHIPPED | OUTPATIENT
Start: 2021-06-15 | End: 2022-06-02

## 2021-06-29 ENCOUNTER — OFFICE VISIT (OUTPATIENT)
Dept: INTERNAL MEDICINE | Facility: IMAGING CENTER | Age: 41
End: 2021-06-29
Payer: COMMERCIAL

## 2021-06-29 ENCOUNTER — HOSPITAL ENCOUNTER (OUTPATIENT)
Facility: MEDICAL CENTER | Age: 41
End: 2021-06-29
Attending: FAMILY MEDICINE
Payer: COMMERCIAL

## 2021-06-29 VITALS
HEIGHT: 65 IN | TEMPERATURE: 99.6 F | OXYGEN SATURATION: 97 % | RESPIRATION RATE: 18 BRPM | SYSTOLIC BLOOD PRESSURE: 100 MMHG | BODY MASS INDEX: 28.46 KG/M2 | DIASTOLIC BLOOD PRESSURE: 60 MMHG | HEART RATE: 112 BPM

## 2021-06-29 DIAGNOSIS — J03.90 TONSILLITIS: ICD-10-CM

## 2021-06-29 LAB
FLUAV+FLUBV AG SPEC QL IA: NEGATIVE
INT CON NEG: NORMAL
INT CON NEG: NORMAL
INT CON POS: NORMAL
INT CON POS: NORMAL
S PYO AG THROAT QL: NEGATIVE

## 2021-06-29 PROCEDURE — 87077 CULTURE AEROBIC IDENTIFY: CPT

## 2021-06-29 PROCEDURE — 99213 OFFICE O/P EST LOW 20 MIN: CPT | Performed by: FAMILY MEDICINE

## 2021-06-29 PROCEDURE — 87880 STREP A ASSAY W/OPTIC: CPT | Performed by: FAMILY MEDICINE

## 2021-06-29 PROCEDURE — 87070 CULTURE OTHR SPECIMN AEROBIC: CPT

## 2021-06-29 PROCEDURE — 87804 INFLUENZA ASSAY W/OPTIC: CPT | Performed by: FAMILY MEDICINE

## 2021-06-29 RX ORDER — CEPHALEXIN 500 MG/1
500 CAPSULE ORAL 3 TIMES DAILY
Qty: 21 CAPSULE | Refills: 0 | Status: SHIPPED | OUTPATIENT
Start: 2021-06-29 | End: 2021-07-15

## 2021-06-29 RX ORDER — PREDNISONE 20 MG/1
20 TABLET ORAL DAILY
Qty: 7 TABLET | Refills: 0 | Status: SHIPPED | OUTPATIENT
Start: 2021-06-29 | End: 2021-07-06

## 2021-06-29 NOTE — LETTER
[facility]  [address] [place] [address]     June 29, 2021    Patient: Breanne Andrea   YOB: 1980   Date of Visit: 6/29/2021       To Whom It May Concern:    Breanne Andrea was seen and treated in our department on 6/29/2021.   She is excused for the rest of the week unless status improves rapidly.    Sincerely,         Leonarda Patrick M.D.

## 2021-06-29 NOTE — PROGRESS NOTES
Chief Complaint   Patient presents with   • Body Aches   • Fever   • Pharyngitis   • Emesis        HPI: This is a 41 y.o. patient has 1 days of sore throat, without cough and congestion.  Denies runny nose, ear fullness. No abnormal shortness of breath.  1 day of nausea vomiting no diarrhea. + fever and chills.  No known sick exposures. No coughing up blood. No headache.   [facility]  She has not been drinking a lot of fluids  She has not been at work        ROS:  No cough, nausea, changes in bowel movements or skin rash.       I reviewed the patient's medications, allergies and medical history:  Current Outpatient Medications   Medication Sig Dispense Refill   • desogestrel-ethinyl estradiol (VIORELE) 0.15-0.02/0.01 MG (21/5) per tablet TAKE 1 ACTIVE TABLET FOR 84 DAYS STRAIGHT THEN TAKE INACTIVE TABLETS FOR 7 DAYS AS DIRECTED 112 tablet 3   • topiramate (TOPAMAX) 50 MG tablet TAKE 1 TABLET BY MOUTH TWICE A  tablet 3   • buPROPion (WELLBUTRIN XL) 300 MG XL tablet Take 300 mg by mouth every morning.     • naratriptan (AMERGE) 2.5 MG tablet TAKE 1 TABLET BY MOUTH ONCE AS NEEDED FOR MIGRAINE FOR UP TO 1 DOSE. MAY REPEAT IN 2 HOURS IF NEEDED. MAX OF 2 DOSES IN 24 HOURS 18 Tab 3   • polyethylene glycol/lytes (MIRALAX) Pack Take 17 g by mouth every day.     • ALPRAZolam (XANAX) 0.25 MG Tab TAKE 1 TABLET BY MOUTH DAILY AS NEEDED FOR 30 DAYS  1   • TRINTELLIX 20 MG Tab Take 20 mg by mouth every day. TAKE 1 TABLET BY MOUTH DAILY  2   • vitamin D (CHOLECALCIFEROL) 1000 UNIT Tab Take 2,000 Units by mouth every day.     • docusate calcium (SURFAK) 240 MG Cap Take 240 mg by mouth 2 times a day.     • Psyllium (METAMUCIL FIBER PO) Take  by mouth.     • triamcinolone acetonide (KENALOG) 0.1 % Cream Apply to affected areas twice daily. 60 g 1   • omeprazole (PRILOSEC) 20 MG CPDR Take 20 mg by mouth every day.       No current facility-administered medications for this visit.     Patient has no known  "allergies.  Past Medical History:   Diagnosis Date   • Anxiety    • Chronic eczema    • Depression 2/18/2014   • GERD (gastroesophageal reflux disease)    • Recurrent major depressive disorder, in remission ([facility]) 2/18/2014        EXAM:  /60   Pulse (!) 112   Temp 37.6 °C (99.6 °F) (Temporal)   Resp 18   Ht 1.651 m (5' 5\")   SpO2 97%   General: NAD, non-toxic appearance.  Eyes: PERRL, conjunctiva slightly injected, no photophobia or eye discharge.  Ears: Normal pinnae. TM's pearly gray with good landmarks bilaterally.  Nares: Patent with thin, clear mucus.  Sinuses: Non-tender over maxillary and frontal sinuses.  Throat: Erythematous injection with  enlarged tonsils.  No exudates.   Neck: Supple, with 2+anterior cervical lymphadenopathy.  Left greater than right  Lungs: Good air entry bilaterally, clear to auscultation. No wheeze, rhonchi or crackles. Normal respiratory effort.  Heart: Regular rate without murmur.  Abdomen: Soft and non-tender. No hepatosplenomegaly.  Skin: Warm and dry. No rash.     Results for orders placed or performed during the hospital encounter of 03/29/21   URINE CULTURE(NEW)    Specimen: Urine   Result Value Ref Range    Significant Indicator NEG     Source UR     Site -     Culture Result Usual urogenital devyn 10-50,000 cfu/mL        ASSESSMENT:   1. Tonsillitis           PLAN:  1. Discussed benign nature of viral upper respiratory infections.  Negative flu and   neg  strep test in office, treat with Keflex, throat culture pending  2. OTC anti-pyretics and decongestants as needed. Supportive care advised.  3. Follow-up in office or urgent care for worsening symptoms, difficulty breathing, lack of expected recovery, or should new symptoms or problems arise.   "

## 2021-07-02 LAB
BACTERIA SPEC RESP CULT: ABNORMAL
BACTERIA SPEC RESP CULT: ABNORMAL
SIGNIFICANT IND 70042: ABNORMAL
SITE SITE: ABNORMAL
SOURCE SOURCE: ABNORMAL

## 2021-07-09 RX ORDER — VORTIOXETINE 20 MG/1
20 TABLET, FILM COATED ORAL DAILY
Qty: 30 TABLET | Refills: 0 | Status: SHIPPED | OUTPATIENT
Start: 2021-07-09

## 2021-07-09 RX ORDER — BUPROPION HYDROCHLORIDE 300 MG/1
300 TABLET ORAL EVERY MORNING
Qty: 30 TABLET | Refills: 0 | Status: SHIPPED | OUTPATIENT
Start: 2021-07-09 | End: 2022-10-28

## 2021-07-13 DIAGNOSIS — Z00.00 LABORATORY EXAMINATION ORDERED AS PART OF A ROUTINE GENERAL MEDICAL EXAMINATION: ICD-10-CM

## 2021-07-15 ENCOUNTER — HOSPITAL ENCOUNTER (OUTPATIENT)
Facility: MEDICAL CENTER | Age: 41
End: 2021-07-15
Attending: FAMILY MEDICINE
Payer: COMMERCIAL

## 2021-07-15 ENCOUNTER — NON-PROVIDER VISIT (OUTPATIENT)
Dept: INTERNAL MEDICINE | Facility: IMAGING CENTER | Age: 41
End: 2021-07-15
Payer: COMMERCIAL

## 2021-07-15 DIAGNOSIS — Z00.00 LABORATORY EXAMINATION ORDERED AS PART OF A ROUTINE GENERAL MEDICAL EXAMINATION: ICD-10-CM

## 2021-07-15 DIAGNOSIS — Z11.3 SCREENING EXAMINATION FOR SEXUALLY TRANSMITTED DISEASE: ICD-10-CM

## 2021-07-15 LAB
ALBUMIN SERPL BCP-MCNC: 4.1 G/DL (ref 3.2–4.9)
ALBUMIN/GLOB SERPL: 1.1 G/DL
ALP SERPL-CCNC: 71 U/L (ref 30–99)
ALT SERPL-CCNC: 10 U/L (ref 2–50)
ANION GAP SERPL CALC-SCNC: 12 MMOL/L (ref 7–16)
AST SERPL-CCNC: 18 U/L (ref 12–45)
BASOPHILS # BLD AUTO: 0.6 % (ref 0–1.8)
BASOPHILS # BLD: 0.04 K/UL (ref 0–0.12)
BILIRUB SERPL-MCNC: 0.4 MG/DL (ref 0.1–1.5)
BUN SERPL-MCNC: 11 MG/DL (ref 8–22)
CALCIUM SERPL-MCNC: 9.1 MG/DL (ref 8.5–10.5)
CHLORIDE SERPL-SCNC: 109 MMOL/L (ref 96–112)
CHOLEST SERPL-MCNC: 188 MG/DL (ref 100–199)
CO2 SERPL-SCNC: 18 MMOL/L (ref 20–33)
CREAT SERPL-MCNC: 1.06 MG/DL (ref 0.5–1.4)
EOSINOPHIL # BLD AUTO: 0.09 K/UL (ref 0–0.51)
EOSINOPHIL NFR BLD: 1.3 % (ref 0–6.9)
ERYTHROCYTE [DISTWIDTH] IN BLOOD BY AUTOMATED COUNT: 44.6 FL (ref 35.9–50)
GLOBULIN SER CALC-MCNC: 3.6 G/DL (ref 1.9–3.5)
GLUCOSE SERPL-MCNC: 89 MG/DL (ref 65–99)
HCT VFR BLD AUTO: 42.3 % (ref 37–47)
HCV AB SER QL: NORMAL
HDLC SERPL-MCNC: 70 MG/DL
HGB BLD-MCNC: 13.5 G/DL (ref 12–16)
HIV 1+2 AB+HIV1 P24 AG SERPL QL IA: NORMAL
IMM GRANULOCYTES # BLD AUTO: 0.02 K/UL (ref 0–0.11)
IMM GRANULOCYTES NFR BLD AUTO: 0.3 % (ref 0–0.9)
LDLC SERPL CALC-MCNC: 97 MG/DL
LYMPHOCYTES # BLD AUTO: 2.1 K/UL (ref 1–4.8)
LYMPHOCYTES NFR BLD: 30.9 % (ref 22–41)
MCH RBC QN AUTO: 29.5 PG (ref 27–33)
MCHC RBC AUTO-ENTMCNC: 31.9 G/DL (ref 33.6–35)
MCV RBC AUTO: 92.6 FL (ref 81.4–97.8)
MONOCYTES # BLD AUTO: 0.49 K/UL (ref 0–0.85)
MONOCYTES NFR BLD AUTO: 7.2 % (ref 0–13.4)
NEUTROPHILS # BLD AUTO: 4.06 K/UL (ref 2–7.15)
NEUTROPHILS NFR BLD: 59.7 % (ref 44–72)
NRBC # BLD AUTO: 0 K/UL
NRBC BLD-RTO: 0 /100 WBC
PLATELET # BLD AUTO: 402 K/UL (ref 164–446)
PMV BLD AUTO: 9.8 FL (ref 9–12.9)
POTASSIUM SERPL-SCNC: 3.9 MMOL/L (ref 3.6–5.5)
PROT SERPL-MCNC: 7.7 G/DL (ref 6–8.2)
RBC # BLD AUTO: 4.57 M/UL (ref 4.2–5.4)
SODIUM SERPL-SCNC: 139 MMOL/L (ref 135–145)
TREPONEMA PALLIDUM IGG+IGM AB [PRESENCE] IN SERUM OR PLASMA BY IMMUNOASSAY: NORMAL
TRIGL SERPL-MCNC: 107 MG/DL (ref 0–149)
TSH SERPL DL<=0.005 MIU/L-ACNC: 1.29 UIU/ML (ref 0.38–5.33)
WBC # BLD AUTO: 6.8 K/UL (ref 4.8–10.8)

## 2021-07-15 PROCEDURE — 86780 TREPONEMA PALLIDUM: CPT

## 2021-07-15 PROCEDURE — 85025 COMPLETE CBC W/AUTO DIFF WBC: CPT

## 2021-07-15 PROCEDURE — 86803 HEPATITIS C AB TEST: CPT

## 2021-07-15 PROCEDURE — 84443 ASSAY THYROID STIM HORMONE: CPT

## 2021-07-15 PROCEDURE — 87389 HIV-1 AG W/HIV-1&-2 AB AG IA: CPT

## 2021-07-15 PROCEDURE — 80061 LIPID PANEL: CPT

## 2021-07-15 PROCEDURE — 80053 COMPREHEN METABOLIC PANEL: CPT

## 2021-07-22 ENCOUNTER — OFFICE VISIT (OUTPATIENT)
Dept: INTERNAL MEDICINE | Facility: IMAGING CENTER | Age: 41
End: 2021-07-22
Payer: COMMERCIAL

## 2021-07-22 ENCOUNTER — HOSPITAL ENCOUNTER (OUTPATIENT)
Facility: MEDICAL CENTER | Age: 41
End: 2021-07-22
Attending: FAMILY MEDICINE
Payer: COMMERCIAL

## 2021-07-22 VITALS
HEIGHT: 65 IN | HEART RATE: 99 BPM | DIASTOLIC BLOOD PRESSURE: 64 MMHG | RESPIRATION RATE: 14 BRPM | WEIGHT: 164 LBS | BODY MASS INDEX: 27.32 KG/M2 | TEMPERATURE: 97.6 F | SYSTOLIC BLOOD PRESSURE: 102 MMHG | OXYGEN SATURATION: 96 %

## 2021-07-22 DIAGNOSIS — Z12.4 ROUTINE PAPANICOLAOU SMEAR: ICD-10-CM

## 2021-07-22 DIAGNOSIS — Z00.00 WELLNESS EXAMINATION: ICD-10-CM

## 2021-07-22 LAB
C TRACH DNA SPEC QL NAA+PROBE: NEGATIVE
N GONORRHOEA DNA SPEC QL NAA+PROBE: NEGATIVE
SPECIMEN SOURCE: NORMAL

## 2021-07-22 PROCEDURE — 99395 PREV VISIT EST AGE 18-39: CPT | Performed by: FAMILY MEDICINE

## 2021-07-22 PROCEDURE — 87591 N.GONORRHOEAE DNA AMP PROB: CPT

## 2021-07-22 PROCEDURE — 88175 CYTOPATH C/V AUTO FLUID REDO: CPT

## 2021-07-22 PROCEDURE — 87491 CHLMYD TRACH DNA AMP PROBE: CPT

## 2021-07-22 PROCEDURE — 87624 HPV HI-RISK TYP POOLED RSLT: CPT

## 2021-07-22 ASSESSMENT — FIBROSIS 4 INDEX: FIB4 SCORE: 0.55

## 2021-07-22 NOTE — PATIENT INSTRUCTIONS
Here are the supplements I recommend for migraine prophylaxis:  - Riboflavin 400 mg daily  - CoQ-10 100 mg twice daily  - Magnesium citrate 600 mg daily

## 2021-07-22 NOTE — PROGRESS NOTES
Subjective:     CC:   Chief Complaint   Patient presents with   • Gynecologic Exam       HPI:   Breanne Andrea is a 41 y.o. female who presents for annual exam    Patient has GYN provider: No   Last Pap Smear: 2 yrs   H/O Abnormal Pap: No   t Mammogram: due in Feb     Last Tdap: *UTD 2029  Received HPV series: No    Exercise: moderate regular exercise, aerobic < 3 days a week  Diet: reg      No LMP recorded. (Menstrual status: Irregular Menses).     Birth control: cont ocps  Menses every month with  90 d  Denies cramping.  No significant bloating/fluid retention, pelvic pain, or dyspareunia. No abnormal vaginal discharge.  No breast tenderness, mass, nipple discharge, changes in size or contour, or abnormal cyclic discomfort.    OB History   No obstetric history on file.      She  has no history on file for sexual activity.    She  has a past medical history of Anxiety, Chronic eczema, Depression (2/18/2014), GERD (gastroesophageal reflux disease), and Recurrent major depressive disorder, in remission (HCC) (2/18/2014). She also has no past medical history of ASTHMA, CAD (coronary artery disease), Cancer (HCC), Congestive heart failure (HCC), COPD, Diabetes, Encounter for long-term (current) use of other medications, Hypertension, Infectious disease, Liver disease, Renal disorder, Seizure disorder (HCC), or Stroke (HCC).  She  has no past surgical history on file.    Family History   Problem Relation Age of Onset   • Cancer Mother         anal cancer , liver mets   • Hypertension Mother    • Heart Disease Father    • Hyperlipidemia Father    • Alcohol abuse Father    • Drug abuse Father    • Cancer Paternal Grandmother         GI   • Stroke Maternal Grandmother    • Heart Disease Paternal Grandfather      Social History     Tobacco Use   • Smoking status: Never Smoker   • Smokeless tobacco: Never Used   Vaping Use   • Vaping Use: Never used   Substance Use Topics   • Alcohol use: Yes     Alcohol/week: 3.0 oz      Types: 5 Shots of liquor per week     Comment: occ   • Drug use: No       Patient Active Problem List    Diagnosis Date Noted   • Frequent diarrhea 03/31/2021   • Migraine without aura and without status migrainosus, not intractable 10/02/2019   • Chronic eczema    • Gastroesophageal reflux disease without esophagitis 07/29/2015   • Recurrent major depressive disorder, in remission ([facility]) 02/18/2014     Current Outpatient Medications   Medication Sig Dispense Refill   • buPROPion (WELLBUTRIN XL) 300 MG XL tablet Take 1 tablet by mouth every morning. 30 tablet 0   • TRINTELLIX 20 MG Tab Take 20 mg by mouth every day. TAKE 1 TABLET BY MOUTH DAILY 30 tablet 0   • desogestrel-ethinyl estradiol (VIORELE) 0.15-0.02/0.01 MG (21/5) per tablet TAKE 1 ACTIVE TABLET FOR 84 DAYS STRAIGHT THEN TAKE INACTIVE TABLETS FOR 7 DAYS AS DIRECTED 112 tablet 3   • topiramate (TOPAMAX) 50 MG tablet TAKE 1 TABLET BY MOUTH TWICE A  tablet 3   • naratriptan (AMERGE) 2.5 MG tablet TAKE 1 TABLET BY MOUTH ONCE AS NEEDED FOR MIGRAINE FOR UP TO 1 DOSE. MAY REPEAT IN 2 HOURS IF NEEDED. MAX OF 2 DOSES IN 24 HOURS 18 Tab 3   • ALPRAZolam (XANAX) 0.25 MG Tab TAKE 1 TABLET BY MOUTH DAILY AS NEEDED FOR 30 DAYS  1   • vitamin D (CHOLECALCIFEROL) 1000 UNIT Tab Take 2,000 Units by mouth every day.     • docusate calcium (SURFAK) 240 MG Cap Take 240 mg by mouth 2 times a day.     • triamcinolone acetonide (KENALOG) 0.1 % Cream Apply to affected areas twice daily. 60 g 1   • omeprazole (PRILOSEC) 20 MG CPDR Take 20 mg by mouth every day.       No current facility-administered medications for this visit.     No Known Allergies    Review of Systems    Constitutional: Negative for fever, chills and malaise/fatigue.   HENT: Negative for congestion.    Eyes: Negative for pain.   Respiratory: Negative for cough and shortness of breath.    Cardiovascular: Negative for chest pain and leg swelling.   Gastrointestinal: Negative for nausea, vomiting, abdominal  "pain and diarrhea.   Genitourinary: Negative for dysuria and hematuria.   Skin: Negative for rash.   Neurological: Negative for dizziness, focal weakness and headaches.   Endo/Heme/Allergies: Does not bruise/bleed easily.   Psychiatric/Behavioral: Negative for depression.  The patient is not nervous/anxious.      Objective:   /64   Pulse 99   Temp 36.4 °C (97.6 °F) (Temporal)   Resp 14   Ht 1.651 m (5' 5\")   Wt 74.4 kg (164 lb)   SpO2 96%   BMI 27.29 kg/m²     Wt Readings from Last 4 Encounters:   07/22/21 74.4 kg (164 lb)   03/31/21 77.6 kg (171 lb)   06/22/20 76.7 kg (169 lb)   01/23/20 74.8 kg (165 lb)            Physical Exam:  Constitutional: Well-developed and well-nourished. Not diaphoretic. No distress.   Skin: Skin is warm and dry. No rash noted.  Head: Atraumatic without lesions.  Eyes: Conjunctivae and extraocular motions are normal. Pupils are equal, round, and reactive to light. No scleral icterus.   Ears:  External ears unremarkable. Tympanic membranes clear and intact.  Nose: Nares patent. Septum midline. Turbinates without erythema nor edema. No discharge.   Mouth/Throat: Tongue normal. Oropharynx is clear and moist. Posterior pharynx without erythema or exudates.  Neck: Supple, trachea midline. Normal range of motion. No thyromegaly present. No lymphadenopathy--cervical or supraclavicular.  Cardiovascular: Regular rate and rhythm, S1 and S2 without murmur, rubs, or gallops.    Respiratory: Effort normal. Clear to auscultation throughout. No adventitious sounds.   Breast: Breasts examined  supine. No skin changes, peau d'orange or nipple retraction. No discharge. No axillary or supraclavicular adenopathy. No masses or nodularity palpable.  Abdomen: Soft, non tender, and without distention. Active bowel sounds in all four quadrants. No rebound, guarding, masses or HSM.  : Perineum and external genitalia normal without rash. Vagina with nl discharge. Cervix without visible lesions or " discharge. Bimanual exam without adnexal masses or cervical motion tenderness.  Extremities: No cyanosis, clubbing, erythema, nor edema. Distal pulses intact and symmetric.   Musculoskeletal: All major joints AROM full in all directions without pain.  Neurological: Alert and oriented x 3. Grossly non-focal. Strength and sensation grossly intact. DTRs 2+/3 and symmetric.   Psychiatric:  Behavior, mood, and affect are appropriate.    Assessment and Plan:     1. Wellness examination    2. Routine Papanicolaou smear      Health maintenance:  Labs per orders  Immunizations per orders  Patient counseled about skin care, diet, supplements, and exercise.  Discussed  mammography screening, adequate intake of calcium and vitamin D, diet and exercise     Follow-up: No follow-ups on file.

## 2021-07-23 LAB
CYTOLOGY REG CYTOL: ABNORMAL
HPV HR 12 DNA CVX QL NAA+PROBE: POSITIVE
HPV16 DNA SPEC QL NAA+PROBE: NEGATIVE
HPV18 DNA SPEC QL NAA+PROBE: NEGATIVE
SPECIMEN SOURCE: ABNORMAL

## 2021-07-27 ENCOUNTER — PATIENT MESSAGE (OUTPATIENT)
Dept: INTERNAL MEDICINE | Facility: IMAGING CENTER | Age: 41
End: 2021-07-27

## 2021-07-27 RX ORDER — METRONIDAZOLE 7.5 MG/G
1 GEL TOPICAL 2 TIMES DAILY
Qty: 45 G | Refills: 3 | Status: SHIPPED | OUTPATIENT
Start: 2021-07-27 | End: 2021-08-01

## 2021-07-27 RX ORDER — FLUCONAZOLE 150 MG/1
150 TABLET ORAL DAILY
Qty: 1 TABLET | Refills: 1 | Status: SHIPPED | OUTPATIENT
Start: 2021-07-27 | End: 2021-09-23

## 2021-08-03 DIAGNOSIS — R87.613 HIGH GRADE SQUAMOUS INTRAEPITHELIAL LESION (HGSIL) ON CYTOLOGIC SMEAR OF CERVIX: ICD-10-CM

## 2021-08-10 ENCOUNTER — OFFICE VISIT (OUTPATIENT)
Dept: INTERNAL MEDICINE | Facility: IMAGING CENTER | Age: 41
End: 2021-08-10
Payer: COMMERCIAL

## 2021-08-10 ENCOUNTER — HOSPITAL ENCOUNTER (OUTPATIENT)
Facility: MEDICAL CENTER | Age: 41
End: 2021-08-10
Attending: FAMILY MEDICINE
Payer: COMMERCIAL

## 2021-08-10 VITALS
DIASTOLIC BLOOD PRESSURE: 60 MMHG | HEART RATE: 87 BPM | BODY MASS INDEX: 27.33 KG/M2 | RESPIRATION RATE: 17 BRPM | SYSTOLIC BLOOD PRESSURE: 108 MMHG | WEIGHT: 164.02 LBS | TEMPERATURE: 97.9 F | OXYGEN SATURATION: 95 % | HEIGHT: 65 IN

## 2021-08-10 DIAGNOSIS — R30.0 DYSURIA: ICD-10-CM

## 2021-08-10 DIAGNOSIS — R35.0 URINARY FREQUENCY: ICD-10-CM

## 2021-08-10 LAB
INT CON NEG: NORMAL
INT CON POS: NORMAL
POC URINE PREGNANCY TEST: NEGATIVE

## 2021-08-10 PROCEDURE — 87510 GARDNER VAG DNA DIR PROBE: CPT

## 2021-08-10 PROCEDURE — 87480 CANDIDA DNA DIR PROBE: CPT

## 2021-08-10 PROCEDURE — 87491 CHLMYD TRACH DNA AMP PROBE: CPT

## 2021-08-10 PROCEDURE — 99214 OFFICE O/P EST MOD 30 MIN: CPT | Performed by: FAMILY MEDICINE

## 2021-08-10 PROCEDURE — 87591 N.GONORRHOEAE DNA AMP PROB: CPT

## 2021-08-10 PROCEDURE — 87660 TRICHOMONAS VAGIN DIR PROBE: CPT

## 2021-08-10 PROCEDURE — 81025 URINE PREGNANCY TEST: CPT | Performed by: FAMILY MEDICINE

## 2021-08-10 PROCEDURE — 81001 URINALYSIS AUTO W/SCOPE: CPT

## 2021-08-10 PROCEDURE — 87077 CULTURE AEROBIC IDENTIFY: CPT

## 2021-08-10 PROCEDURE — 87186 SC STD MICRODIL/AGAR DIL: CPT

## 2021-08-10 PROCEDURE — 87086 URINE CULTURE/COLONY COUNT: CPT

## 2021-08-10 ASSESSMENT — FIBROSIS 4 INDEX: FIB4 SCORE: 0.55

## 2021-08-11 LAB
APPEARANCE UR: ABNORMAL
BACTERIA #/AREA URNS HPF: ABNORMAL /HPF
BILIRUB UR QL STRIP.AUTO: NEGATIVE
C TRACH DNA SPEC QL NAA+PROBE: NEGATIVE
CANDIDA DNA VAG QL PROBE+SIG AMP: NEGATIVE
COLOR UR: YELLOW
EPI CELLS #/AREA URNS HPF: NEGATIVE /HPF
G VAGINALIS DNA VAG QL PROBE+SIG AMP: NEGATIVE
GLUCOSE UR STRIP.AUTO-MCNC: NEGATIVE MG/DL
HYALINE CASTS #/AREA URNS LPF: ABNORMAL /LPF
KETONES UR STRIP.AUTO-MCNC: NEGATIVE MG/DL
LEUKOCYTE ESTERASE UR QL STRIP.AUTO: ABNORMAL
MICRO URNS: ABNORMAL
N GONORRHOEA DNA SPEC QL NAA+PROBE: NEGATIVE
NITRITE UR QL STRIP.AUTO: POSITIVE
PH UR STRIP.AUTO: 6.5 [PH] (ref 5–8)
PROT UR QL STRIP: NEGATIVE MG/DL
RBC # URNS HPF: ABNORMAL /HPF
RBC UR QL AUTO: NEGATIVE
SP GR UR STRIP.AUTO: 1.02
SPECIMEN SOURCE: NORMAL
T VAGINALIS DNA VAG QL PROBE+SIG AMP: NEGATIVE
UROBILINOGEN UR STRIP.AUTO-MCNC: 1 MG/DL
WBC #/AREA URNS HPF: ABNORMAL /HPF

## 2021-08-11 NOTE — PROGRESS NOTES
"Chief Complaint   Patient presents with   • Vaginitis     Symptoms are all over the place. Sometimes she feels like it is better and then other times it seems worse.       Subjective:     HPI:   Breanne Andrea is a 41 y.o. female here  to discuss the evaluation and management of:         No problems updated.     Objective:     /60 (BP Location: Left arm, Patient Position: Sitting, BP Cuff Size: Adult)   Pulse 87   Temp 36.6 °C (97.9 °F) (Temporal)   Resp 17   Ht 1.651 m (5' 5\")   Wt 74.4 kg (164 lb 0.4 oz)   SpO2 95%  Body mass index is 27.29 kg/m².    Physical Exam:  Physical Exam  Genitourinary:     General: Normal vulva.      Vagina: Vaginal discharge present.      Comments: Milky/pale yellow homogenous    No lesions present on speculum exam    Constitutional: Well-developed and well-nourished. Not diaphoretic. No distress.   Skin: Skin is warm and dry. No rash noted.  Head: Atraumatic without lesions.  Eyes: Conjunctivae and extraocular motions are normal.   Ears:  External ears unremarkable.     s.               Abdomen:  without distention.  .  Extremities: No cyanosis, clubbing, erythema, nor edema.   Neurological: Alert and oriented x 3.    Psychiatric:  Behavior, mood, and affect are appropriate     Assessment and Plan:     The following treatment plan was discussed:     No problem-specific Assessment & Plan notes found for this encounter.    1. Dysuria  URINALYSIS,CULTURE IF INDICATED    POCT Pregnancy   2. Urinary frequency  URINALYSIS,CULTURE IF INDICATED    POCT Pregnancy     Urine pregnancy test is negative, culture submitted  Specimens were submitted to rule out BV, trichomonas, yeast and STI    Treatment as per results, may use Vagisil in the interim    Any change or worsening of signs or symptoms, patient encouraged to follow-up or report to emergency room for further evaluation. Patient verbalizes understanding and agrees.    Follow-Up: No follow-ups on file.      PLEASE NOTE: This " dictation was created using voice recognition software. I have made every reasonable attempt to correct obvious errors, but I expect that there are errors of grammar and possibly content that I did not discover before finalizing the note.    My total time spent caring for the patient on the day of the encounter was  greater than 30 minutes.   This includes obtaining history, reviewing chart, physical exam, patient education, reviewing outside records, placing orders, interpreting tests and coordinating care.

## 2021-08-13 RX ORDER — CEPHALEXIN 500 MG/1
500 CAPSULE ORAL 3 TIMES DAILY
Qty: 21 CAPSULE | Refills: 0 | Status: SHIPPED | OUTPATIENT
Start: 2021-08-13 | End: 2021-09-23

## 2021-09-01 RX ORDER — NARATRIPTAN 2.5 MG/1
TABLET ORAL
Qty: 18 TABLET | Refills: 3 | Status: SHIPPED | OUTPATIENT
Start: 2021-09-01 | End: 2022-11-22

## 2021-09-21 ENCOUNTER — HOSPITAL ENCOUNTER (OUTPATIENT)
Facility: MEDICAL CENTER | Age: 41
End: 2021-09-21
Attending: FAMILY MEDICINE
Payer: COMMERCIAL

## 2021-09-21 ENCOUNTER — NON-PROVIDER VISIT (OUTPATIENT)
Dept: INTERNAL MEDICINE | Facility: IMAGING CENTER | Age: 41
End: 2021-09-21
Payer: COMMERCIAL

## 2021-09-21 DIAGNOSIS — R05.9 COUGH: ICD-10-CM

## 2021-09-21 LAB — COVID ORDER STATUS COVID19: NORMAL

## 2021-09-21 PROCEDURE — U0005 INFEC AGEN DETEC AMPLI PROBE: HCPCS

## 2021-09-21 PROCEDURE — U0003 INFECTIOUS AGENT DETECTION BY NUCLEIC ACID (DNA OR RNA); SEVERE ACUTE RESPIRATORY SYNDROME CORONAVIRUS 2 (SARS-COV-2) (CORONAVIRUS DISEASE [COVID-19]), AMPLIFIED PROBE TECHNIQUE, MAKING USE OF HIGH THROUGHPUT TECHNOLOGIES AS DESCRIBED BY CMS-2020-01-R: HCPCS

## 2021-09-22 LAB
SARS-COV-2 RNA RESP QL NAA+PROBE: NOTDETECTED
SPECIMEN SOURCE: NORMAL

## 2021-09-23 ENCOUNTER — GYNECOLOGY VISIT (OUTPATIENT)
Dept: OBGYN | Facility: CLINIC | Age: 41
End: 2021-09-23
Payer: COMMERCIAL

## 2021-09-23 ENCOUNTER — HOSPITAL ENCOUNTER (OUTPATIENT)
Facility: MEDICAL CENTER | Age: 41
End: 2021-09-23
Attending: OBSTETRICS & GYNECOLOGY
Payer: COMMERCIAL

## 2021-09-23 ENCOUNTER — OFFICE VISIT (OUTPATIENT)
Dept: INTERNAL MEDICINE | Facility: IMAGING CENTER | Age: 41
End: 2021-09-23
Payer: COMMERCIAL

## 2021-09-23 VITALS — SYSTOLIC BLOOD PRESSURE: 130 MMHG | WEIGHT: 154 LBS | DIASTOLIC BLOOD PRESSURE: 88 MMHG | BODY MASS INDEX: 25.63 KG/M2

## 2021-09-23 VITALS
HEART RATE: 95 BPM | TEMPERATURE: 98.3 F | RESPIRATION RATE: 14 BRPM | OXYGEN SATURATION: 97 % | SYSTOLIC BLOOD PRESSURE: 122 MMHG | DIASTOLIC BLOOD PRESSURE: 70 MMHG

## 2021-09-23 DIAGNOSIS — R05.9 COUGH: ICD-10-CM

## 2021-09-23 DIAGNOSIS — R87.613 HSIL (HIGH GRADE SQUAMOUS INTRAEPITHELIAL LESION) ON PAP SMEAR OF CERVIX: ICD-10-CM

## 2021-09-23 DIAGNOSIS — R87.619 ABNORMAL CERVICAL PAPANICOLAOU SMEAR, UNSPECIFIED ABNORMAL PAP FINDING: ICD-10-CM

## 2021-09-23 LAB
INT CON NEG: NORMAL
INT CON POS: NORMAL
PATHOLOGY CONSULT NOTE: NORMAL
POC URINE PREGNANCY TEST: NEGATIVE

## 2021-09-23 PROCEDURE — 90471 IMMUNIZATION ADMIN: CPT | Performed by: OBSTETRICS & GYNECOLOGY

## 2021-09-23 PROCEDURE — 99213 OFFICE O/P EST LOW 20 MIN: CPT | Performed by: FAMILY MEDICINE

## 2021-09-23 PROCEDURE — 57454 BX/CURETT OF CERVIX W/SCOPE: CPT | Performed by: OBSTETRICS & GYNECOLOGY

## 2021-09-23 PROCEDURE — 88342 IMHCHEM/IMCYTCHM 1ST ANTB: CPT

## 2021-09-23 PROCEDURE — 88305 TISSUE EXAM BY PATHOLOGIST: CPT | Mod: 59

## 2021-09-23 PROCEDURE — 90651 9VHPV VACCINE 2/3 DOSE IM: CPT | Performed by: OBSTETRICS & GYNECOLOGY

## 2021-09-23 PROCEDURE — 81025 URINE PREGNANCY TEST: CPT | Performed by: OBSTETRICS & GYNECOLOGY

## 2021-09-23 RX ORDER — METRONIDAZOLE 7.5 MG/G
GEL TOPICAL
COMMUNITY
Start: 2021-08-01 | End: 2022-10-28

## 2021-09-23 RX ORDER — PROMETHAZINE HYDROCHLORIDE, PHENYLEPHRINE HYDROCHLORIDE AND CODEINE PHOSPHATE 6.25; 5; 1 MG/5ML; MG/5ML; MG/5ML
5 SOLUTION ORAL EVERY 8 HOURS PRN
Qty: 118 ML | Refills: 0 | Status: SHIPPED | OUTPATIENT
Start: 2021-09-23 | End: 2021-10-07

## 2021-09-23 RX ORDER — FLUCONAZOLE 150 MG/1
150 TABLET ORAL DAILY
Qty: 1 TABLET | Refills: 1 | Status: SHIPPED | OUTPATIENT
Start: 2021-09-23 | End: 2022-01-21 | Stop reason: SDUPTHER

## 2021-09-23 RX ORDER — AMOXICILLIN AND CLAVULANATE POTASSIUM 875; 125 MG/1; MG/1
1 TABLET, FILM COATED ORAL 2 TIMES DAILY
Qty: 20 TABLET | Refills: 0 | Status: SHIPPED | OUTPATIENT
Start: 2021-09-23 | End: 2022-10-28

## 2021-09-23 ASSESSMENT — FIBROSIS 4 INDEX: FIB4 SCORE: 0.55

## 2021-09-23 NOTE — NON-PROVIDER
Pt here as a new patient for an abnormal pap 07/22/2021  Pt states no questions or concerns   Good# [phone]   Pharmacy verified

## 2021-09-23 NOTE — PROGRESS NOTES
Breanne Andrea  41 y.o.  Female G0 here today for colposcopy to evaluate her for abnormal pap:     Doing well today without concerns.  First abnl pap ever.  Has been getting routine paps    OBHx: G0    GYNHx:   Menarche 13, q90day cycle with current pill, 2-7d  No STIs  MORENITA for contraception, first abnl pap    PMHx: migraines, no aura  Depression/anxiety    Med: trintellix, wellbutrin, topiramate, volnea, xanax    FamHx: denies breast/colon/ovarian ca      Colposcopy    Speculum is placed and cervix is visualized. The cervix is cleansed with dilute acetic acid solution.     Squamo-columnar junction seen: YES/NO   Entire lesion seen: YES, acetowhite circumferentially, small mosaicism around 4-5 o'clock  Biopsies taken at 4, 7, 11, 1 o'clock  ECC collected.  monsels applied with hemostasis noted.      A/P: 41 y.o. G0 with HSIL pap  Colpo adequate  Impression: TU 2-3    Prior to procedure, discussed risks of pain/bleeding/infection.  All questions answered, consent signed.    Discussed pending pathology suspect next step will be LEEP.  If with invasive ca would refer directly to gyn[facility] for management but anticipate will need LEEP.  Discussed can do in office vs OR.  Pt thinks would prefer office and anticipate will be a good candidate for in office procedure.    Will notify of pathology results.    Recommend Gardasil vaccination if improved at age 47 she has not been vaccinated previously.  Patient accepting of #1 today      Poppy Peck MD  [facility], Women's Health

## 2021-09-24 NOTE — PROGRESS NOTES
Chief Complaint   Patient presents with   • Sinusitis        HPI: Patient is a 41 y.o. female complaining of 10* days of illness including: productive of yellow sputum cough, nasal congestion, rhinorrhea, sore throat.   Mucus is: thick.  Covid test  Similarly ill exposures: no.  Treatments tried: otc  She  reports that she has never smoked. She has never used smokeless tobacco..     ROS:  No fever, nausea, changes in bowel movements or skin rash.       I reviewed the patient's medications, allergies and medical history:  Current Outpatient Medications   Medication Sig Dispense Refill   • amoxicillin-clavulanate (AUGMENTIN) 875-125 MG Tab Take 1 Tablet by mouth 2 times a day. 20 Tablet 0   • Promethazine-Phenyleph-Codeine (PHENERGAN VC CODEINE) 6.25-5-10 MG/5ML Syrup Take 5 mL by mouth every 8 hours as needed for up to 14 days. 118 mL 0   • fluconazole (DIFLUCAN) 150 MG tablet Take 1 Tablet by mouth every day. 1 Tablet 1   • metronidazole (METROGEL) 0.75 % gel APPLY 1 APPLICATOR TOPICALLY 2 TIMES A DAY FOR 5 DAYS.     • naratriptan (AMERGE) 2.5 MG tablet TAKE 1 TABLET BY MOUTH ONCE AS NEEDED FOR MIGRAINE FOR UP TO 1 DOSE. MAY REPEAT IN 2 HOURS IF NEEDED. MAX OF 2 DOSES IN 24 HOURS 18 Tablet 3   • buPROPion (WELLBUTRIN XL) 300 MG XL tablet Take 1 tablet by mouth every morning. 30 tablet 0   • TRINTELLIX 20 MG Tab Take 20 mg by mouth every day. TAKE 1 TABLET BY MOUTH DAILY 30 tablet 0   • desogestrel-ethinyl estradiol (VIORELE) 0.15-0.02/0.01 MG (21/5) per tablet TAKE 1 ACTIVE TABLET FOR 84 DAYS STRAIGHT THEN TAKE INACTIVE TABLETS FOR 7 DAYS AS DIRECTED 112 tablet 3   • topiramate (TOPAMAX) 50 MG tablet TAKE 1 TABLET BY MOUTH TWICE A  tablet 3   • ALPRAZolam (XANAX) 0.25 MG Tab TAKE 1 TABLET BY MOUTH DAILY AS NEEDED FOR 30 DAYS  1   • vitamin D (CHOLECALCIFEROL) 1000 UNIT Tab Take 2,000 Units by mouth every day.     • docusate calcium (SURFAK) 240 MG Cap Take 240 mg by mouth 2 times a day.     • triamcinolone  acetonide (KENALOG) 0.1 % Cream Apply to affected areas twice daily. 60 g 1   • omeprazole (PRILOSEC) 20 MG CPDR Take 20 mg by mouth every day.       No current facility-administered medications for this visit.     Patient has no known allergies.  Past Medical History:   Diagnosis Date   • Anxiety    • Chronic eczema    • Depression 2/18/2014   • GERD (gastroesophageal reflux disease)    • Migraine    • Recurrent major depressive disorder, in remission ([facility]) 2/18/2014        EXAM:  /70   Pulse 95   Temp 36.8 °C (98.3 °F) (Temporal)   Resp 14   SpO2 97%   General: Alert, no conversational dyspnea or audible wheeze, non-toxic appearance.  Eyes: PERRL, conjunctiva slightly injected, no eye discharge.  Ears: Normal pinnae,TM's normal bilaterally.  Nares: Patent with thin mucus.     Throat: Erythematous injection without exudate.   Neck: Supple   Lungs: Clear to auscultation bilaterally, no wheeze, crackles or rhonchi.   Heart: Regular rate without murmur.  Skin: Warm and dry without rash.     ASSESSMENT: 1. Cough  - Promethazine-Phenyleph-Codeine (PHENERGAN VC CODEINE) 6.25-5-10 MG/5ML Syrup; Take 5 mL by mouth every 8 hours as needed for up to 14 days.  Dispense: 118 mL; Refill: 0    Other orders  - amoxicillin-clavulanate (AUGMENTIN) 875-125 MG Tab; Take 1 Tablet by mouth 2 times a day.  Dispense: 20 Tablet; Refill: 0  - fluconazole (DIFLUCAN) 150 MG tablet; Take 1 Tablet by mouth every day.  Dispense: 1 Tablet; Refill: 1       PLAN:  1. Educated patient that majority of upper respiratory infections are viral and do not need antibiotics.  *As symptoms have been worsening over the last week, will treat with antibiotics.  2. Twice daily use of nasal saline rinse or Neti-Pot.  3. OTC anti-pyretics and decongestants as needed.  4. Follow-up in office or urgent care for worsening symptoms, difficulty breathing, lack of expected recovery, or should new symptoms or problems arise.

## 2021-12-24 ENCOUNTER — GYNECOLOGY VISIT (OUTPATIENT)
Dept: OBGYN | Facility: CLINIC | Age: 41
End: 2021-12-24

## 2021-12-24 ENCOUNTER — HOSPITAL ENCOUNTER (OUTPATIENT)
Facility: MEDICAL CENTER | Age: 41
End: 2021-12-24
Attending: OBSTETRICS & GYNECOLOGY
Payer: COMMERCIAL

## 2021-12-24 ENCOUNTER — APPOINTMENT (OUTPATIENT)
Dept: OBGYN | Facility: CLINIC | Age: 41
End: 2021-12-24
Payer: COMMERCIAL

## 2021-12-24 VITALS — DIASTOLIC BLOOD PRESSURE: 89 MMHG | WEIGHT: 156 LBS | SYSTOLIC BLOOD PRESSURE: 120 MMHG | BODY MASS INDEX: 25.96 KG/M2

## 2021-12-24 DIAGNOSIS — D06.9 HIGH GRADE SQUAMOUS INTRAEPITHELIAL LESION (HGSIL), GRADE 3 CIN, ON BIOPSY OF CERVIX: ICD-10-CM

## 2021-12-24 DIAGNOSIS — R87.613 HSIL (HIGH GRADE SQUAMOUS INTRAEPITHELIAL LESION) ON PAP SMEAR OF CERVIX: Primary | ICD-10-CM

## 2021-12-24 DIAGNOSIS — R87.613 HSIL (HIGH GRADE SQUAMOUS INTRAEPITHELIAL LESION) ON PAP SMEAR OF CERVIX: ICD-10-CM

## 2021-12-24 DIAGNOSIS — Z23 NEED FOR HPV VACCINATION: ICD-10-CM

## 2021-12-24 PROCEDURE — 81025 URINE PREGNANCY TEST: CPT | Performed by: OBSTETRICS & GYNECOLOGY

## 2021-12-24 PROCEDURE — 88305 TISSUE EXAM BY PATHOLOGIST: CPT | Mod: 59

## 2021-12-24 PROCEDURE — 88307 TISSUE EXAM BY PATHOLOGIST: CPT

## 2021-12-24 PROCEDURE — 57522 CONIZATION OF CERVIX: CPT | Performed by: OBSTETRICS & GYNECOLOGY

## 2021-12-24 PROCEDURE — 90471 IMMUNIZATION ADMIN: CPT | Performed by: OBSTETRICS & GYNECOLOGY

## 2021-12-24 PROCEDURE — 90651 9VHPV VACCINE 2/3 DOSE IM: CPT | Performed by: OBSTETRICS & GYNECOLOGY

## 2021-12-24 ASSESSMENT — FIBROSIS 4 INDEX: FIB4 SCORE: 0.55

## 2021-12-24 NOTE — NON-PROVIDER
Pt here for [facility]   Pt states no concerns   Good # [phone]  Pharmacy verified.

## 2021-12-24 NOTE — PROGRESS NOTES
GYN Visit:    CC: LEEP    HPI: pt here for LEEP for TU 2-2 on bx after HSIL pap.  Doing well today without concerns.    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      LEEP:  Coated speculum was inserted into the vagina with adequate visualization of the cervix.  Paracervical block placed with 20cc of dilute epinephrine in 1% lidocaine, 5cc each at 2, 4, 8, and 10 o'clock.  Pt still reports some pain so additional 10cc placed intracervically. The cervix was painted with Lugols.  LEEP was performed with 20x15 electrode.  Post LEEP ECC was collected and sent to pathology.  The LEEP bed was coagulated with rollerball.  Monsels were applied with hemostasis noted.  All instruments were removed from the vagina.  The pt tolerated the procedure well, there were no complications.    A/P: 41 y.o.  now s/p LEEP for TU 2-3  Prior to procedure discussed risks including pain/bleeding/infection, risk of damage to surrounding structures including vagina, bladder, and rectum.  All questions answered.  Consents signed.      - return to clinic 1 month to discuss pathology and f/u LEEP; will notify when pathology available.  - discussed bleeding precautions, to avoid intercourse x3wks     - gardisil #2 given today.      Poppy Peck MD  [facility], Women's Health

## 2022-01-20 ENCOUNTER — HOSPITAL ENCOUNTER (OUTPATIENT)
Facility: MEDICAL CENTER | Age: 42
End: 2022-01-20
Attending: OBSTETRICS & GYNECOLOGY
Payer: COMMERCIAL

## 2022-01-20 ENCOUNTER — GYNECOLOGY VISIT (OUTPATIENT)
Dept: OBGYN | Facility: CLINIC | Age: 42
End: 2022-01-20
Payer: COMMERCIAL

## 2022-01-20 VITALS — DIASTOLIC BLOOD PRESSURE: 76 MMHG | BODY MASS INDEX: 25.63 KG/M2 | SYSTOLIC BLOOD PRESSURE: 112 MMHG | WEIGHT: 154 LBS

## 2022-01-20 DIAGNOSIS — N89.8 VAGINAL DISCHARGE: ICD-10-CM

## 2022-01-20 LAB
CANDIDA DNA VAG QL PROBE+SIG AMP: NEGATIVE
G VAGINALIS DNA VAG QL PROBE+SIG AMP: POSITIVE
T VAGINALIS DNA VAG QL PROBE+SIG AMP: NEGATIVE

## 2022-01-20 PROCEDURE — 99213 OFFICE O/P EST LOW 20 MIN: CPT | Mod: 24 | Performed by: OBSTETRICS & GYNECOLOGY

## 2022-01-20 PROCEDURE — 87660 TRICHOMONAS VAGIN DIR PROBE: CPT

## 2022-01-20 PROCEDURE — 87480 CANDIDA DNA DIR PROBE: CPT

## 2022-01-20 PROCEDURE — 87510 GARDNER VAG DNA DIR PROBE: CPT

## 2022-01-20 ASSESSMENT — FIBROSIS 4 INDEX: FIB4 SCORE: 0.55

## 2022-01-20 NOTE — NON-PROVIDER
Pt here to discuss results  Pt states that she has more discharge and more watery   Good #[phone]  Pharmacy verified.

## 2022-01-20 NOTE — PROGRESS NOTES
GYN Visit:    CC: f/u LEEP, discuss pathology, vaginal discharge.        HPI: 41 y.o.yo  here to f/u LEEP.  No VB but does report vaginal discharge recently, small itching.  Mostly water.    ROS:   Gen: denies fevers, general concerns  Abd: denies abd pain, concerns  : see HPI    /76   Wt 69.9 kg (154 lb)   Breastfeeding No   BMI 25.63 kg/m²   Gen: AAO, NAD  : NEFG normal va    Pathology:  FINAL DIAGNOSIS:     A. LEEP cervical biopsy:          Residual moderate squamous dysplasia (TU 2, HGSIL) with human           papillomavirus effect and focal involvement in endocervical           glands.          Endocervical and ectocervical surgical margins free of           dysplasia.   B. Endocervical curettings:          Scant fragments of endocervical epithelium with no diagnostic           abnormality.          No dysplasia identified.       A/P:41 y.o.yo  w/ hx of TU 2 now with vaginal discharge  - LEEP site healing well  - vaginal pathogens for d/c, will treat pending results      F/u: prn/annually    Poppy Pekc MD  [facility], Women's Health

## 2022-01-21 RX ORDER — METRONIDAZOLE 500 MG/1
500 TABLET ORAL 2 TIMES DAILY
Qty: 14 TABLET | Refills: 0 | Status: SHIPPED | OUTPATIENT
Start: 2022-01-21 | End: 2022-01-28

## 2022-01-21 RX ORDER — FLUCONAZOLE 150 MG/1
150 TABLET ORAL ONCE
Qty: 1 TABLET | Refills: 0 | Status: SHIPPED | OUTPATIENT
Start: 2022-01-21 | End: 2022-01-21

## 2022-06-02 RX ORDER — DESOGESTREL AND ETHINYL ESTRADIOL 21-5 (28)
KIT ORAL
Qty: 112 TABLET | Refills: 3 | Status: SHIPPED | OUTPATIENT
Start: 2022-06-02 | End: 2023-04-27

## 2022-06-22 RX ORDER — TOPIRAMATE 50 MG/1
TABLET, FILM COATED ORAL
Qty: 180 TABLET | Refills: 3 | Status: SHIPPED | OUTPATIENT
Start: 2022-06-22 | End: 2023-08-04

## 2022-09-27 ENCOUNTER — HOSPITAL ENCOUNTER (OUTPATIENT)
Dept: RADIOLOGY | Facility: MEDICAL CENTER | Age: 42
End: 2022-09-27
Attending: FAMILY MEDICINE
Payer: COMMERCIAL

## 2022-09-27 DIAGNOSIS — R92.8 ABNORMAL MAMMOGRAM OF LEFT BREAST: ICD-10-CM

## 2022-09-27 DIAGNOSIS — Z12.31 VISIT FOR SCREENING MAMMOGRAM: ICD-10-CM

## 2022-09-27 PROCEDURE — 77063 BREAST TOMOSYNTHESIS BI: CPT

## 2022-09-27 RX ORDER — BUPROPION HYDROCHLORIDE 200 MG/1
200 TABLET, EXTENDED RELEASE ORAL 2 TIMES DAILY
COMMUNITY
Start: 2022-08-11

## 2022-10-06 ENCOUNTER — HOSPITAL ENCOUNTER (OUTPATIENT)
Dept: RADIOLOGY | Facility: MEDICAL CENTER | Age: 42
End: 2022-10-06
Attending: FAMILY MEDICINE
Payer: COMMERCIAL

## 2022-10-06 DIAGNOSIS — R92.8 ABNORMAL MAMMOGRAM: ICD-10-CM

## 2022-10-06 PROCEDURE — 76642 ULTRASOUND BREAST LIMITED: CPT | Mod: LT

## 2022-10-06 PROCEDURE — G0279 TOMOSYNTHESIS, MAMMO: HCPCS

## 2022-10-14 NOTE — RESULT ENCOUNTER NOTE
Lets repeat these tests in April  If you have not received a letter from radiology recheck test in March for an order

## 2022-10-28 ENCOUNTER — HOSPITAL ENCOUNTER (EMERGENCY)
Facility: MEDICAL CENTER | Age: 42
End: 2022-10-28
Attending: EMERGENCY MEDICINE
Payer: COMMERCIAL

## 2022-10-28 ENCOUNTER — APPOINTMENT (OUTPATIENT)
Dept: RADIOLOGY | Facility: MEDICAL CENTER | Age: 42
End: 2022-10-28
Attending: EMERGENCY MEDICINE
Payer: COMMERCIAL

## 2022-10-28 VITALS
BODY MASS INDEX: 24.65 KG/M2 | WEIGHT: 147.93 LBS | HEART RATE: 80 BPM | OXYGEN SATURATION: 100 % | RESPIRATION RATE: 16 BRPM | HEIGHT: 65 IN | TEMPERATURE: 97.3 F | DIASTOLIC BLOOD PRESSURE: 71 MMHG | SYSTOLIC BLOOD PRESSURE: 141 MMHG

## 2022-10-28 DIAGNOSIS — R07.89 CHEST DISCOMFORT: ICD-10-CM

## 2022-10-28 LAB
ALBUMIN SERPL BCP-MCNC: 4.2 G/DL (ref 3.2–4.9)
ALBUMIN/GLOB SERPL: 1 G/DL
ALP SERPL-CCNC: 68 U/L (ref 30–99)
ALT SERPL-CCNC: 19 U/L (ref 2–50)
ANION GAP SERPL CALC-SCNC: 13 MMOL/L (ref 7–16)
AST SERPL-CCNC: 22 U/L (ref 12–45)
BASOPHILS # BLD AUTO: 0.6 % (ref 0–1.8)
BASOPHILS # BLD: 0.05 K/UL (ref 0–0.12)
BILIRUB SERPL-MCNC: 0.3 MG/DL (ref 0.1–1.5)
BUN SERPL-MCNC: 16 MG/DL (ref 8–22)
CALCIUM SERPL-MCNC: 9.5 MG/DL (ref 8.4–10.2)
CHLORIDE SERPL-SCNC: 103 MMOL/L (ref 96–112)
CO2 SERPL-SCNC: 18 MMOL/L (ref 20–33)
CREAT SERPL-MCNC: 1.02 MG/DL (ref 0.5–1.4)
D DIMER PPP IA.FEU-MCNC: 0.43 UG/ML (FEU) (ref 0–0.5)
EKG IMPRESSION: NORMAL
EOSINOPHIL # BLD AUTO: 0.13 K/UL (ref 0–0.51)
EOSINOPHIL NFR BLD: 1.4 % (ref 0–6.9)
ERYTHROCYTE [DISTWIDTH] IN BLOOD BY AUTOMATED COUNT: 45.6 FL (ref 35.9–50)
GFR SERPLBLD CREATININE-BSD FMLA CKD-EPI: 71 ML/MIN/1.73 M 2
GLOBULIN SER CALC-MCNC: 4.1 G/DL (ref 1.9–3.5)
GLUCOSE SERPL-MCNC: 83 MG/DL (ref 65–99)
HCG SERPL QL: NEGATIVE
HCT VFR BLD AUTO: 44.1 % (ref 37–47)
HGB BLD-MCNC: 14.4 G/DL (ref 12–16)
IMM GRANULOCYTES # BLD AUTO: 0.01 K/UL (ref 0–0.11)
IMM GRANULOCYTES NFR BLD AUTO: 0.1 % (ref 0–0.9)
LYMPHOCYTES # BLD AUTO: 3.14 K/UL (ref 1–4.8)
LYMPHOCYTES NFR BLD: 35 % (ref 22–41)
MCH RBC QN AUTO: 30.1 PG (ref 27–33)
MCHC RBC AUTO-ENTMCNC: 32.7 G/DL (ref 33.6–35)
MCV RBC AUTO: 92.1 FL (ref 81.4–97.8)
MONOCYTES # BLD AUTO: 0.52 K/UL (ref 0–0.85)
MONOCYTES NFR BLD AUTO: 5.8 % (ref 0–13.4)
NEUTROPHILS # BLD AUTO: 5.13 K/UL (ref 2–7.15)
NEUTROPHILS NFR BLD: 57.1 % (ref 44–72)
NRBC # BLD AUTO: 0 K/UL
NRBC BLD-RTO: 0 /100 WBC
PLATELET # BLD AUTO: 375 K/UL (ref 164–446)
PMV BLD AUTO: 9.2 FL (ref 9–12.9)
POTASSIUM SERPL-SCNC: 3.8 MMOL/L (ref 3.6–5.5)
PROT SERPL-MCNC: 8.3 G/DL (ref 6–8.2)
RBC # BLD AUTO: 4.79 M/UL (ref 4.2–5.4)
SODIUM SERPL-SCNC: 134 MMOL/L (ref 135–145)
TROPONIN T SERPL-MCNC: <6 NG/L (ref 6–19)
TROPONIN T SERPL-MCNC: <6 NG/L (ref 6–19)
WBC # BLD AUTO: 9 K/UL (ref 4.8–10.8)

## 2022-10-28 PROCEDURE — 85025 COMPLETE CBC W/AUTO DIFF WBC: CPT

## 2022-10-28 PROCEDURE — 36415 COLL VENOUS BLD VENIPUNCTURE: CPT

## 2022-10-28 PROCEDURE — 700102 HCHG RX REV CODE 250 W/ 637 OVERRIDE(OP): Performed by: EMERGENCY MEDICINE

## 2022-10-28 PROCEDURE — 80053 COMPREHEN METABOLIC PANEL: CPT

## 2022-10-28 PROCEDURE — 700111 HCHG RX REV CODE 636 W/ 250 OVERRIDE (IP): Performed by: EMERGENCY MEDICINE

## 2022-10-28 PROCEDURE — 96374 THER/PROPH/DIAG INJ IV PUSH: CPT

## 2022-10-28 PROCEDURE — 71045 X-RAY EXAM CHEST 1 VIEW: CPT

## 2022-10-28 PROCEDURE — 84484 ASSAY OF TROPONIN QUANT: CPT

## 2022-10-28 PROCEDURE — A9270 NON-COVERED ITEM OR SERVICE: HCPCS | Performed by: EMERGENCY MEDICINE

## 2022-10-28 PROCEDURE — 93005 ELECTROCARDIOGRAM TRACING: CPT | Performed by: EMERGENCY MEDICINE

## 2022-10-28 PROCEDURE — 85379 FIBRIN DEGRADATION QUANT: CPT

## 2022-10-28 PROCEDURE — 700105 HCHG RX REV CODE 258: Performed by: EMERGENCY MEDICINE

## 2022-10-28 PROCEDURE — 99284 EMERGENCY DEPT VISIT MOD MDM: CPT

## 2022-10-28 PROCEDURE — 84703 CHORIONIC GONADOTROPIN ASSAY: CPT

## 2022-10-28 RX ORDER — SODIUM CHLORIDE, SODIUM LACTATE, POTASSIUM CHLORIDE, CALCIUM CHLORIDE 600; 310; 30; 20 MG/100ML; MG/100ML; MG/100ML; MG/100ML
1000 INJECTION, SOLUTION INTRAVENOUS ONCE
Status: COMPLETED | OUTPATIENT
Start: 2022-10-28 | End: 2022-10-28

## 2022-10-28 RX ORDER — DOCUSATE SODIUM 100 MG/1
100 CAPSULE, LIQUID FILLED ORAL DAILY
COMMUNITY

## 2022-10-28 RX ORDER — SUCRALFATE 1 G/1
1 TABLET ORAL
Qty: 56 TABLET | Refills: 0 | Status: SHIPPED | OUTPATIENT
Start: 2022-10-28 | End: 2022-11-11

## 2022-10-28 RX ORDER — NAPROXEN 500 MG/1
500 TABLET ORAL 2 TIMES DAILY WITH MEALS
Qty: 20 TABLET | Refills: 0 | Status: SHIPPED | OUTPATIENT
Start: 2022-10-28 | End: 2022-11-07

## 2022-10-28 RX ORDER — CETIRIZINE HYDROCHLORIDE 10 MG/1
10 TABLET ORAL DAILY
Status: SHIPPED | COMMUNITY
End: 2023-04-03

## 2022-10-28 RX ADMIN — SODIUM CHLORIDE, POTASSIUM CHLORIDE, SODIUM LACTATE AND CALCIUM CHLORIDE 1000 ML: 600; 310; 30; 20 INJECTION, SOLUTION INTRAVENOUS at 15:46

## 2022-10-28 RX ADMIN — FAMOTIDINE 20 MG: 10 INJECTION, SOLUTION INTRAVENOUS at 15:47

## 2022-10-28 RX ADMIN — LIDOCAINE HYDROCHLORIDE 30 ML: 20 SOLUTION OROPHARYNGEAL at 15:47

## 2022-10-28 ASSESSMENT — FIBROSIS 4 INDEX: FIB4 SCORE: 0.57

## 2022-10-28 NOTE — ED PROVIDER NOTES
"ED Provider Note    CHIEF COMPLAINT  Chief Complaint   Patient presents with    Chest Pain     Acute onset Retrosternal CP x 3-4 hrs  Non radiating  Sharp in nature Worse with movement and deep insp.  No associated SOB, diaph, N/V, cough or fever  On BCP and had a recent 7 hr car trip  Denies calf pain or leg swelling No hx claudication       HPI  Patient is a 42-year-old female with past medical history of anxiety, depression and GERD and recurrent migraines who presents emergency room for evaluation of new onset chest discomfort.  This started approximately 4 hours ago and was unprovoked with any exertion.  She describes it as midline, lower nonradiating and intermittently sharp in \"uncomfortable\" in nature.  This has been associated with some worsening with certain positional movements and deep inspiration.  This makes it feel specifically more sharp in certain areas.  She does describe having some amounts of nonspecific cough and congestion within the last several weeks and has been taking GERD medications which she originally thought this site of discomfort was attributed to indigestion.  When this came on she had no sudden onset shortness of breath, no diaphoresis or nausea or vomiting.  She has had no abdominal discomfort at this leg swelling, no peripheral edema or claudication-like symptoms.  She is on oral contraceptives, has no prior history of PE or DVT and endorses a recent prolonged car trip but no recent surgical procedures or immobility or bedbound status.    PPE Note: I personally donned full PPE for all patient encounters during this visit, including being clean-shaven with an N95 respirator mask, gloves, and goggles.     REVIEW OF SYSTEMS  See HPI for further details. All other systems are negative.     PAST MEDICAL HISTORY   has a past medical history of Anxiety, Chronic eczema, Depression (2/18/2014), GERD (gastroesophageal reflux disease), Migraine, and Recurrent major depressive disorder, in " "remission (HCC) (2/18/2014).    SOCIAL HISTORY  Social History     Tobacco Use    Smoking status: Never    Smokeless tobacco: Never   Vaping Use    Vaping Use: Never used   Substance and Sexual Activity    Alcohol use: Yes     Alcohol/week: 3.0 oz     Types: 5 Shots of liquor per week     Comment: occ    Drug use: No    Sexual activity: Yes     Partners: Male     Birth control/protection: Pill     Comment:      SURGICAL HISTORY  patient denies any surgical history    CURRENT MEDICATIONS  Home Medications       Reviewed by Fili Waggoner (Pharmacy Tech) on 10/28/22 at 1519  Med List Status: Complete     Medication Last Dose Status   ALPRAZolam (XANAX) 0.25 MG Tab ABOUT 1 WEEK AGO Active   buPROPion (WELLBUTRIN SR) 200 MG SR tablet 10/28/2022 Active   cetirizine (ZYRTEC ALLERGY) 10 MG Tab 10/27/2022 Active   desogestrel-ethinyl estradiol (VOLNEA) 0.15-0.02/0.01 MG (21/5) per tablet 10/27/2022 Active   docusate sodium (COLACE) 100 MG Cap 10/28/2022 Active   naratriptan (AMERGE) 2.5 MG tablet 10/17/2022 Active   omeprazole (PRILOSEC) 20 MG CPDR 10/27/2022 Active   topiramate (TOPAMAX) 50 MG tablet 10/28/2022 Active   TRINTELLIX 20 MG Tab 10/28/2022 Active   vitamin D (CHOLECALCIFEROL) 1000 UNIT Tab 10/28/2022 Active                  ALLERGIES  No Known Allergies    PHYSICAL EXAM  VITAL SIGNS: BP (!) 141/71   Pulse 80   Temp 36.3 °C (97.3 °F)   Resp 16   Ht 1.651 m (5' 5\")   Wt 67.1 kg (147 lb 14.9 oz)   SpO2 100%   BMI 24.62 kg/m²   Pulse ox interpretation: I interpret this pulse ox as normal.  Genl: F sitting in gurney comfortably, speaking clearly, appears slightly anxious but in no acute distress   Head: NC/AT   ENT: Mucous membranes moist, posterior pharynx clear, uvula midline, nares patent bilaterally   Eyes: Normal sclera, pupils equal round reactive to light  Neck: Supple, FROM, no LAD appreciated, no bruits  Pulmonary: Lungs are clear to auscultation bilaterally  Chest: Small amounts of " minimally discomforting pain with palpation along the lower midline sternum and costophrenic margins.  CV: Tachycardia, no murmur appreciated, pulses 2+ in both upper and lower extremities,  Abdomen: soft, NT/ND; no rebound/guarding, no masses palpated, no HSM   : no CVA or suprapubic tenderness   Musculoskeletal: Pain free ROM of the neck. Moving upper and lower extremities and spontaneous in coordinated fashion  Neuro: A&Ox4 (person, place, time, situation), speech fluent, gait steady, no focal deficits appreciated  Skin: No rash or lesions.  No pallor or jaundice.  No cyanosis.  Warm and dry.     DIAGNOSTIC STUDIES / PROCEDURES    EKG  As interpreted by me and 1432: This is a sinus rhythm, rate of 96, normal intervals, normal axis, P wave pointing towards possible atrial enlargement, no acute ischemia, infarction and no prior EKG for comparison.    LABS  Labs Reviewed   CBC WITH DIFFERENTIAL - Abnormal; Notable for the following components:       Result Value    MCHC 32.7 (*)     All other components within normal limits    Narrative:     Biotin intake of greater than 5 mg per day may interfere with  troponin levels, causing false low values.   COMP METABOLIC PANEL - Abnormal; Notable for the following components:    Sodium 134 (*)     Co2 18 (*)     Total Protein 8.3 (*)     Globulin 4.1 (*)     All other components within normal limits    Narrative:     Biotin intake of greater than 5 mg per day may interfere with  troponin levels, causing false low values.   HCG QUAL SERUM    Narrative:     Biotin intake of greater than 5 mg per day may interfere with  troponin levels, causing false low values.   D-DIMER    Narrative:     Biotin intake of greater than 5 mg per day may interfere with  troponin levels, causing false low values.   TROPONIN    Narrative:     Biotin intake of greater than 5 mg per day may interfere with  troponin levels, causing false low values.   ESTIMATED GFR    Narrative:     Biotin intake of  greater than 5 mg per day may interfere with  troponin levels, causing false low values.   TROPONIN    Narrative:     Biotin intake of greater than 5 mg per day may interfere with  troponin levels, causing false low values.     RADIOLOGY  DX-CHEST-PORTABLE (1 VIEW)   Final Result      No evidence of acute cardiopulmonary process.        COURSE & MEDICAL DECISION MAKING  Pertinent Labs & Imaging studies reviewed. (See chart for details)    DDX:  ACS  Pneumothorax  Pulmonary embolism  Aortic dissection  Pneumonia  Myocarditis  Endocarditis  Pancreatitis  GERD  Anxiety    MDM  Initial evaluation at 1458:  Patient presents with a chief complaint of chest pain with my initial primary concern includes the differential listed above. Triage vital signs are reviewed and are notable for initial tachycardia, hypertension, though she appears initially very anxious.  On subsequent reevaluations the patient's heart rate is normalizing, blood pressure is downward trending. PERC score is not used as the patient had abnormal vital signs and is on exogenous estrogens.  While I believe PE/DVT is immediately lower likelihood she does have some disqualifying factors and D-dimer is ordered. There is no unilateral leg swelling.  Pulses are symmetrical.      I obtained intravenous access. I reviewed the patient's history reported in the chart. A chest xray obtained and is unremarkable for pneumonia, pneumothorax, or widened mediastinum on my read. Laboratory analysis is obtained to evaluate for myocardial ischemia, evidence of infection, electrolyte abnormality, and abdominal sources for a cause for the patient's chest pain which are reassuring. Troponins are negative x2, d-dimer is not elevated. EKG shows no acute evidence of ischemia or infarction.  There is some prominence of the P waves that would suggest possible atrial abnormality but there is no prior for comparison on the monitor the patient has no evolving ectopy or other acute  findings.  She is able to ambulate, she tolerates p.o. GI cocktail and Pepcid without difficulty.  She is pain-free on reassessment and I updated her regarding her labs and medical imaging.    ?  As there are no significant laboratory findings, a nonischemic EKG, and troponins, it is felt that the patient is not experiencing acute myocardial infarction at this time. Based on the patient's history, physical exam, and laboratory analysis, the patient's most likely diagnosis is GERD/esophagitis/anxiety/PUD/Costochondritis. A discussion was had with the patient in which it was explained that the risk for heart attack and death is very low, approaching < 1% according to the best available evidence.   Patient will be treated symptomatically with initiation of Carafate, she does have GI prophylaxis medications, she will also take some short course of naproxen for any possible inflammatory changes following a recent viral infection.  He is given strictreturn precautions should she have any exertional chest discomfort, chest pressure sensations that otis with cessation of activity and she verbalizes my concerns understandings and the current likely diagnosis.  Discharged home in stable condition.  ?  HEART SCORE:1 (familial risk factors)    FINAL IMPRESSION  Visit Diagnoses     ICD-10-CM   1. Chest discomfort  R07.89     Electronically signed by: Luis Hernandez M.D., 10/28/2022 2:58 PM

## 2022-10-29 NOTE — ED NOTES
D/c pt home, rx given . Pt aware of f/u instructions , aware to return for any changes or concerns. No further questions upon d/c home from ed

## 2023-01-16 RX ORDER — FLUCONAZOLE 150 MG/1
150 TABLET ORAL DAILY
Qty: 1 TABLET | Refills: 1 | Status: SHIPPED | OUTPATIENT
Start: 2023-01-16 | End: 2023-04-03

## 2023-04-03 ENCOUNTER — OFFICE VISIT (OUTPATIENT)
Dept: INTERNAL MEDICINE | Facility: IMAGING CENTER | Age: 43
End: 2023-04-03
Payer: COMMERCIAL

## 2023-04-03 ENCOUNTER — HOSPITAL ENCOUNTER (OUTPATIENT)
Facility: MEDICAL CENTER | Age: 43
End: 2023-04-03
Attending: FAMILY MEDICINE
Payer: COMMERCIAL

## 2023-04-03 VITALS
OXYGEN SATURATION: 99 % | BODY MASS INDEX: 23.99 KG/M2 | DIASTOLIC BLOOD PRESSURE: 70 MMHG | HEIGHT: 65 IN | WEIGHT: 144 LBS | TEMPERATURE: 97.3 F | HEART RATE: 97 BPM | SYSTOLIC BLOOD PRESSURE: 118 MMHG | RESPIRATION RATE: 14 BRPM

## 2023-04-03 DIAGNOSIS — R92.8 ABNORMAL MAMMOGRAM OF LEFT BREAST: ICD-10-CM

## 2023-04-03 DIAGNOSIS — Z00.00 LABORATORY EXAMINATION ORDERED AS PART OF A ROUTINE GENERAL MEDICAL EXAMINATION: ICD-10-CM

## 2023-04-03 DIAGNOSIS — R63.4 WEIGHT LOSS: ICD-10-CM

## 2023-04-03 DIAGNOSIS — R82.90 ABNORMAL URINE FINDINGS: ICD-10-CM

## 2023-04-03 DIAGNOSIS — G43.009 MIGRAINE WITHOUT AURA AND WITHOUT STATUS MIGRAINOSUS, NOT INTRACTABLE: ICD-10-CM

## 2023-04-03 DIAGNOSIS — Z00.00 ANNUAL PHYSICAL EXAM: ICD-10-CM

## 2023-04-03 DIAGNOSIS — R19.5 CHANGE IN STOOL CALIBER: ICD-10-CM

## 2023-04-03 LAB
ALBUMIN SERPL BCP-MCNC: 4.1 G/DL (ref 3.2–4.9)
ALBUMIN/GLOB SERPL: 1.1 G/DL
ALP SERPL-CCNC: 62 U/L (ref 30–99)
ALT SERPL-CCNC: 14 U/L (ref 2–50)
ANION GAP SERPL CALC-SCNC: 13 MMOL/L (ref 7–16)
APPEARANCE UR: CLEAR
AST SERPL-CCNC: 17 U/L (ref 12–45)
BACTERIA #/AREA URNS HPF: NEGATIVE /HPF
BASOPHILS # BLD AUTO: 0.6 % (ref 0–1.8)
BASOPHILS # BLD: 0.05 K/UL (ref 0–0.12)
BILIRUB SERPL-MCNC: 0.4 MG/DL (ref 0.1–1.5)
BILIRUB UR QL STRIP.AUTO: NEGATIVE
BUN SERPL-MCNC: 18 MG/DL (ref 8–22)
CALCIUM ALBUM COR SERPL-MCNC: 9.1 MG/DL (ref 8.5–10.5)
CALCIUM SERPL-MCNC: 9.2 MG/DL (ref 8.5–10.5)
CHLORIDE SERPL-SCNC: 105 MMOL/L (ref 96–112)
CHOLEST SERPL-MCNC: 201 MG/DL (ref 100–199)
CO2 SERPL-SCNC: 20 MMOL/L (ref 20–33)
COLOR UR: YELLOW
CREAT SERPL-MCNC: 1.22 MG/DL (ref 0.5–1.4)
EOSINOPHIL # BLD AUTO: 0.1 K/UL (ref 0–0.51)
EOSINOPHIL NFR BLD: 1.3 % (ref 0–6.9)
EPI CELLS #/AREA URNS HPF: ABNORMAL /HPF
ERYTHROCYTE [DISTWIDTH] IN BLOOD BY AUTOMATED COUNT: 44.8 FL (ref 35.9–50)
GFR SERPLBLD CREATININE-BSD FMLA CKD-EPI: 57 ML/MIN/1.73 M 2
GLOBULIN SER CALC-MCNC: 3.9 G/DL (ref 1.9–3.5)
GLUCOSE SERPL-MCNC: 76 MG/DL (ref 65–99)
GLUCOSE UR STRIP.AUTO-MCNC: NEGATIVE MG/DL
HCT VFR BLD AUTO: 44.3 % (ref 37–47)
HDLC SERPL-MCNC: 74 MG/DL
HGB BLD-MCNC: 14.2 G/DL (ref 12–16)
HYALINE CASTS #/AREA URNS LPF: ABNORMAL /LPF
IMM GRANULOCYTES # BLD AUTO: 0.02 K/UL (ref 0–0.11)
IMM GRANULOCYTES NFR BLD AUTO: 0.3 % (ref 0–0.9)
KETONES UR STRIP.AUTO-MCNC: NEGATIVE MG/DL
LDLC SERPL CALC-MCNC: 108 MG/DL
LEUKOCYTE ESTERASE UR QL STRIP.AUTO: ABNORMAL
LYMPHOCYTES # BLD AUTO: 2.49 K/UL (ref 1–4.8)
LYMPHOCYTES NFR BLD: 32.1 % (ref 22–41)
MCH RBC QN AUTO: 30.4 PG (ref 27–33)
MCHC RBC AUTO-ENTMCNC: 32.1 G/DL (ref 33.6–35)
MCV RBC AUTO: 94.9 FL (ref 81.4–97.8)
MICRO URNS: ABNORMAL
MONOCYTES # BLD AUTO: 0.41 K/UL (ref 0–0.85)
MONOCYTES NFR BLD AUTO: 5.3 % (ref 0–13.4)
NEUTROPHILS # BLD AUTO: 4.69 K/UL (ref 2–7.15)
NEUTROPHILS NFR BLD: 60.4 % (ref 44–72)
NITRITE UR QL STRIP.AUTO: NEGATIVE
NRBC # BLD AUTO: 0 K/UL
NRBC BLD-RTO: 0 /100 WBC
PH UR STRIP.AUTO: 6.5 [PH] (ref 5–8)
PLATELET # BLD AUTO: 397 K/UL (ref 164–446)
PMV BLD AUTO: 10 FL (ref 9–12.9)
POTASSIUM SERPL-SCNC: 3.5 MMOL/L (ref 3.6–5.5)
PROT SERPL-MCNC: 8 G/DL (ref 6–8.2)
PROT UR QL STRIP: NEGATIVE MG/DL
RBC # BLD AUTO: 4.67 M/UL (ref 4.2–5.4)
RBC # URNS HPF: ABNORMAL /HPF
RBC UR QL AUTO: NEGATIVE
SODIUM SERPL-SCNC: 138 MMOL/L (ref 135–145)
SP GR UR STRIP.AUTO: 1.02
TRIGL SERPL-MCNC: 97 MG/DL (ref 0–149)
TSH SERPL DL<=0.005 MIU/L-ACNC: 1.75 UIU/ML (ref 0.38–5.33)
UROBILINOGEN UR STRIP.AUTO-MCNC: 1 MG/DL
WBC # BLD AUTO: 7.8 K/UL (ref 4.8–10.8)
WBC #/AREA URNS HPF: ABNORMAL /HPF

## 2023-04-03 PROCEDURE — 99396 PREV VISIT EST AGE 40-64: CPT | Performed by: FAMILY MEDICINE

## 2023-04-03 PROCEDURE — 81001 URINALYSIS AUTO W/SCOPE: CPT

## 2023-04-03 PROCEDURE — 84443 ASSAY THYROID STIM HORMONE: CPT

## 2023-04-03 PROCEDURE — 87491 CHLMYD TRACH DNA AMP PROBE: CPT

## 2023-04-03 PROCEDURE — 80061 LIPID PANEL: CPT

## 2023-04-03 PROCEDURE — 80053 COMPREHEN METABOLIC PANEL: CPT

## 2023-04-03 PROCEDURE — 87591 N.GONORRHOEAE DNA AMP PROB: CPT

## 2023-04-03 PROCEDURE — 99214 OFFICE O/P EST MOD 30 MIN: CPT | Mod: 25 | Performed by: FAMILY MEDICINE

## 2023-04-03 PROCEDURE — 85025 COMPLETE CBC W/AUTO DIFF WBC: CPT

## 2023-04-03 RX ORDER — UBROGEPANT 100 MG/1
TABLET ORAL
Qty: 1 TABLET | Refills: 0 | COMMUNITY
Start: 2023-04-03

## 2023-04-03 RX ORDER — AMOXICILLIN 500 MG/1
CAPSULE ORAL
COMMUNITY
Start: 2023-01-13 | End: 2023-04-03

## 2023-04-03 RX ORDER — UBROGEPANT 100 MG/1
TABLET ORAL
Qty: 30 TABLET | Refills: 3 | Status: SHIPPED | OUTPATIENT
Start: 2023-04-03

## 2023-04-03 ASSESSMENT — FIBROSIS 4 INDEX: FIB4 SCORE: 0.55

## 2023-04-03 NOTE — PROGRESS NOTES
Subjective:     CC:       HPI:   Breanne Andrea is a 43 y.o. female who presents for annual exam    Patient has GYN provider: Yes  Last Pap Smear:    H/O Abnormal Pap: Yes, status post LEEP procedure  Last Mammogram: Follow-up mammogram due this month  L   Last Colorectal Cancer Screening: Has not had 1     Received HPV series: Yes         No LMP recorded. (Menstrual status: Irregular Menses).     Birth control: OCPs, has.  Every 3 months       No significant bloating/fluid retention, pelvic pain, or dyspareunia. No abnormal vaginal discharge.  No breast tenderness, mass, nipple discharge, changes in size or contour, or abnormal cyclic discomfort.    OB History    Para Term  AB Living   0 0 0 0 0 0   SAB IAB Ectopic Molar Multiple Live Births   0 0 0 0 0 0      She  reports being sexually active and has had partner(s) who are male. She reports using the following method of birth control/protection: Pill.    She  has a past medical history of Anxiety, Chronic eczema, Depression (2014), GERD (gastroesophageal reflux disease), Migraine, and Recurrent major depressive disorder, in remission ([facility]) (2014).    She has no past medical history of Addisons disease ([facility]), Adrenal disorder ([facility]), Allergy, Anemia, Arrhythmia, Arthritis, ASTHMA, Blood transfusion without reported diagnosis, CAD (coronary artery disease), Cancer ([facility]), Cataract, Clotting disorder ([facility]), Congestive heart failure ([facility]), COPD, Cushings syndrome ([facility]), Diabetes, Diabetic neuropathy ([facility]), Encounter for long-term (current) use of other medications, Glaucoma, Goiter, Head ache, Heart attack ([facility]), Heart murmur, HIV (human immunodeficiency virus infection) ([facility]), Hyperlipidemia, Hypertension, IBD (inflammatory bowel disease), Infectious disease, Liver disease, Meningitis, Muscle disorder, Osteoporosis, Parathyroid disorder ([facility]), Pituitary disease ([facility]), Pulmonary emphysema (HCC), Renal disorder, Seizure ([facility]), Seizure  disorder (HCC), Sickle cell disease (HCC), Stroke (HCC), Substance abuse (HCC), Thyroid disease, Tuberculosis, or Urinary tract infection.  She  has no past surgical history on file.    Family History   Problem Relation Age of Onset    Cancer Mother         anal cancer , liver mets    Hypertension Mother     Heart Disease Father     Hyperlipidemia Father     Alcohol abuse Father     Drug abuse Father     Cancer Paternal Grandmother         GI    Stroke Maternal Grandmother     Heart Disease Paternal Grandfather      Social History     Tobacco Use    Smoking status: Never    Smokeless tobacco: Never   Vaping Use    Vaping Use: Never used   Substance Use Topics    Alcohol use: Yes     Alcohol/week: 3.0 oz     Types: 5 Shots of liquor per week     Comment: occ    Drug use: No       Patient Active Problem List    Diagnosis Date Noted    Frequent diarrhea 03/31/2021    Migraine without aura and without status migrainosus, not intractable 10/02/2019    Chronic eczema     Gastroesophageal reflux disease without esophagitis 07/29/2015    Recurrent major depressive disorder, in remission ([facility]) 02/18/2014     Current Outpatient Medications   Medication Sig Dispense Refill    Ubrogepant (UBRELVY) 100 MG Tab Initially 50 mg or 100 mg p.o. x1  as needed migraine. May repeat x1 at least 2 hours after initial dose with a maximum of 200 mg/day 30 Tablet 3    Ubrogepant (UBRELVY) 100 MG Tab Initially 50 mg or 100 mg p.o. x1  as needed migraine. May repeat x1 at least 2 hours after initial dose with a maximum of 200 mg/day 1 Tablet 0    naratriptan (AMERGE) 2.5 MG tablet TAKE 1 TABLET ONCE AS NEEDED FOR MIGRAINE FOR UP TO 1 DOSE. MAY REPEAT IN 2 HOURS IF NEEDED, MAXIMUM OF 2 DOSES IN 24 HOURS. 18 Tablet 2    docusate sodium (COLACE) 100 MG Cap Take 100 mg by mouth every day.      buPROPion (WELLBUTRIN SR) 200 MG SR tablet Take 200 mg by mouth 2 times a day. Takes in the AM and Afternoon      topiramate (TOPAMAX) 50 MG tablet TAKE 1  "TABLET BY MOUTH TWICE A DAY (Patient taking differently: Take 50 mg by mouth 2 times a day. TAKE 1 TABLET BY MOUTH TWICE A DAY) 180 Tablet 3    desogestrel-ethinyl estradiol (VOLNEA) 0.15-0.02/0.01 MG (21/5) per tablet TAKE 1 ACTIVE TABLET FOR 84 DAYS STRAIGHT THEN TAKE INACTIVE TABLETS FOR 7 DAYS AS DIRECTED 112 Tablet 3    TRINTELLIX 20 MG Tab Take 20 mg by mouth every day. TAKE 1 TABLET BY MOUTH DAILY 30 tablet 0    ALPRAZolam (XANAX) 0.25 MG Tab Take 0.25 mg by mouth 1 time a day as needed for Anxiety.  1    vitamin D (CHOLECALCIFEROL) 1000 UNIT Tab Take 1,000 Units by mouth every day.      omeprazole (PRILOSEC) 20 MG CPDR Take 20 mg by mouth every day.       No current facility-administered medications for this visit.     No Known Allergies    Review of Systems    Constitutional: Negative for fever, chills and malaise/fatigue.   HENT: Negative for congestion.    Eyes: Negative for pain.   Respiratory: Negative for cough and shortness of breath.    Cardiovascular:  +chest pain and  no leg swelling.   Gastrointestinal: Negative for nausea, vomiting, abdominal pain and diarrhea.   Genitourinary: Negative for dysuria and hematuria.   Skin: Negative for rash.   Neurological: Negative for dizziness, focal weakness  + headaches.   Endo/Heme/Allergies: Does not bruise/bleed easily.   Psychiatric/Behavioral: Negative for depression.  The patient is not nervous/anxious.      Objective:   /70   Pulse 97   Temp 36.3 °C (97.3 °F)   Resp 14   Ht 1.651 m (5' 5\")   Wt 65.3 kg (144 lb)   SpO2 99%   BMI 23.96 kg/m²     Wt Readings from Last 4 Encounters:   04/03/23 65.3 kg (144 lb)   10/28/22 67.1 kg (147 lb 14.9 oz)   01/20/22 69.9 kg (154 lb)   12/24/21 70.8 kg (156 lb)           Physical Exam:   Constitutional: Well-developed and well-nourished. Not diaphoretic. No distress.   Skin: Skin is warm and dry. No rash noted.  Head: Atraumatic without lesions.  Eyes: Conjunctivae and extraocular motions are normal. " Pupils are equal, round, and reactive to light. No scleral icterus.   Ears:  External ears unremarkable. Tympanic membranes clear and intact.  Nose: Nares patent. Septum midline. Turbinates without erythema nor edema. No discharge.   Mouth/Throat: Tongue normal. Oropharynx is clear and moist. Posterior pharynx without erythema or exudates.  Neck: Supple, trachea midline. Normal range of motion. No thyromegaly present. No lymphadenopathy--cervical or supraclavicular.  Cardiovascular: Regular rate and rhythm, S1 and S2 without murmur, rubs, or gallops.    Respiratory: Effort normal. Clear to auscultation throughout. No adventitious sounds.     Abdomen: Soft, mildly tender in the lower quadrants, and without distention.  No rebound, guarding, masses or HSM.  : Perineum and external genitalia normal without rash. Vagina with normal and physiologic discharge. Cervix without visible lesions or discharge.  Bimanual exam without adnexal masses or cervical motion tenderness.  Extremities: No cyanosis, clubbing, erythema, nor edema. Distal pulses intact and symmetric.   Musculoskeletal: All major joints AROM full in all directions without pain.  Neurological: Alert and oriented x 3. Grossly non-focal. Strength and sensation grossly intact. DTRs 2+/3 and symmetric.   Psychiatric:  Behavior, mood, and affect are appropriate.    Assessment and Plan:     1. Annual physical exam  - Chlamydia/GC, PCR (Urine); Future    2. Abnormal mammogram of left breast  - MA-DIAGNOSTIC MAMMO LEFT W/TOMOSYNTHESIS W/O CAD; Future  - US-BREAST LIMITED-LEFT; Future    3. Laboratory examination ordered as part of a routine general medical examination  - CBC WITH DIFFERENTIAL; Future  - Comp Metabolic Panel; Future  - Lipid Profile; Future  - TSH; Future        Separate note/problem visit  Chief Complaint   Patient presents with    Abdominal Pain     Cramping, noticing fecal matter when she urinates.        She reports a decreased appetite over the  last 9 months  w/ weight loss over the last 2 years     She sometimes notices fecal matter when she wipes  Her urine has had very foul odor  She had very severe lower abdominal cramps about 6 weeks ago-this is been off-and-on though for years    She has 1 sexual partner over the last year  She has an upcoming appointment with gynecologist for annual checkup    She does have history of anal fissures so when she sees blood in the stool she is not very concerned  There is a family history of an aunt with  She also has a personal history of GERD and is on PPI    Mother has a history of anal cancer with mets    Vital signs / physical exam see above annual exam note    1. Abnormal urine findings-possible fistula  - Referral to Urology  - URINALYSIS,CULTURE IF INDICATED; Future    5. Weight loss-will refer for diagnostic testing  - Referral to GI for Colonoscopy    6. Change in stool caliber  - Referral to GI for Colonoscopy    7. Migraine without aura and without status migrainosus, not intractable-they are frequent, triggered by hormonal changes, storms and sometimes stress  Sometimes she has to stay home from work despite using triptan and preventative medication  Trial of Ubrelvy, sample given    Other orders  - Ubrogepant (UBRELVY) 100 MG Tab; Initially 50 mg or 100 mg p.o. x1  as needed migraine. May repeat x1 at least 2 hours after initial dose with a maximum of 200 mg/day  Dispense: 30 Tablet; Refill: 3  - Ubrogepant (UBRELVY) 100 MG Tab; Initially 50 mg or 100 mg p.o. x1  as needed migraine. May repeat x1 at least 2 hours after initial dose with a maximum of 200 mg/day  Dispense: 1 Tablet; Refill: 0      Health maintenance: Declines COVID and hepatitis B vaccines  Labs per orders     Patient counseled about migraines, vaccines, colonoscopies  Discussed  mammography screening, colorectal cancer screening     Follow-up: Annually and as needed

## 2023-04-21 ENCOUNTER — HOSPITAL ENCOUNTER (OUTPATIENT)
Dept: RADIOLOGY | Facility: MEDICAL CENTER | Age: 43
End: 2023-04-21
Attending: FAMILY MEDICINE
Payer: COMMERCIAL

## 2023-04-21 DIAGNOSIS — R92.8 ABNORMAL MAMMOGRAM OF LEFT BREAST: ICD-10-CM

## 2023-04-21 PROCEDURE — G0279 TOMOSYNTHESIS, MAMMO: HCPCS

## 2023-04-27 RX ORDER — DESOGESTREL AND ETHINYL ESTRADIOL 21-5 (28)
KIT ORAL
Qty: 112 TABLET | Refills: 3 | Status: SHIPPED | OUTPATIENT
Start: 2023-04-27

## 2023-05-15 ENCOUNTER — HOSPITAL ENCOUNTER (OUTPATIENT)
Facility: MEDICAL CENTER | Age: 43
End: 2023-05-15
Attending: OBSTETRICS & GYNECOLOGY
Payer: COMMERCIAL

## 2023-05-15 ENCOUNTER — HOSPITAL ENCOUNTER (OUTPATIENT)
Dept: LAB | Facility: MEDICAL CENTER | Age: 43
End: 2023-05-15
Attending: OBSTETRICS & GYNECOLOGY

## 2023-05-15 LAB — PATHOLOGY CONSULT NOTE: NORMAL

## 2023-05-15 PROCEDURE — 88305 TISSUE EXAM BY PATHOLOGIST: CPT | Mod: 59

## 2023-05-15 PROCEDURE — 88342 IMHCHEM/IMCYTCHM 1ST ANTB: CPT

## 2023-05-17 PROBLEM — R85.613: Status: ACTIVE | Noted: 2023-05-17

## 2023-07-05 ENCOUNTER — HOSPITAL ENCOUNTER (OUTPATIENT)
Facility: MEDICAL CENTER | Age: 43
End: 2023-07-05
Attending: OBSTETRICS & GYNECOLOGY
Payer: COMMERCIAL

## 2023-07-05 LAB
AMBIGUOUS DTTM AMBI4: NORMAL
PATHOLOGY CONSULT NOTE: NORMAL

## 2023-07-05 PROCEDURE — 88307 TISSUE EXAM BY PATHOLOGIST: CPT

## 2023-07-05 PROCEDURE — 88305 TISSUE EXAM BY PATHOLOGIST: CPT

## 2023-08-04 RX ORDER — TOPIRAMATE 50 MG/1
TABLET, FILM COATED ORAL
Qty: 180 TABLET | Refills: 1 | Status: SHIPPED | OUTPATIENT
Start: 2023-08-04 | End: 2024-02-06

## 2023-08-10 RX ORDER — NARATRIPTAN 2.5 MG/1
TABLET ORAL
Qty: 18 TABLET | Refills: 2 | Status: SHIPPED | OUTPATIENT
Start: 2023-08-10

## 2023-08-23 ENCOUNTER — HOSPITAL ENCOUNTER (OUTPATIENT)
Facility: MEDICAL CENTER | Age: 43
End: 2023-08-23
Attending: FAMILY MEDICINE
Payer: COMMERCIAL

## 2023-08-23 ENCOUNTER — APPOINTMENT (OUTPATIENT)
Dept: INTERNAL MEDICINE | Facility: IMAGING CENTER | Age: 43
End: 2023-08-23
Payer: COMMERCIAL

## 2023-08-23 DIAGNOSIS — R53.83 OTHER FATIGUE: ICD-10-CM

## 2023-08-23 LAB — VIT B12 SERPL-MCNC: 594 PG/ML (ref 211–911)

## 2023-08-23 PROCEDURE — 82607 VITAMIN B-12: CPT

## 2024-01-15 ENCOUNTER — OFFICE VISIT (OUTPATIENT)
Dept: INTERNAL MEDICINE | Facility: IMAGING CENTER | Age: 44
End: 2024-01-15
Payer: COMMERCIAL

## 2024-01-15 ENCOUNTER — NON-PROVIDER VISIT (OUTPATIENT)
Dept: INTERNAL MEDICINE | Facility: IMAGING CENTER | Age: 44
End: 2024-01-15
Payer: COMMERCIAL

## 2024-01-15 VITALS
SYSTOLIC BLOOD PRESSURE: 124 MMHG | OXYGEN SATURATION: 98 % | RESPIRATION RATE: 14 BRPM | DIASTOLIC BLOOD PRESSURE: 76 MMHG | HEART RATE: 90 BPM | TEMPERATURE: 98.7 F

## 2024-01-15 DIAGNOSIS — Z00.00 LABORATORY EXAMINATION ORDERED AS PART OF A ROUTINE GENERAL MEDICAL EXAMINATION: ICD-10-CM

## 2024-01-15 DIAGNOSIS — J02.0 STREP THROAT: Primary | ICD-10-CM

## 2024-01-15 DIAGNOSIS — R05.1 ACUTE COUGH: ICD-10-CM

## 2024-01-15 LAB
FLUAV RNA SPEC QL NAA+PROBE: NEGATIVE
FLUBV RNA SPEC QL NAA+PROBE: NEGATIVE
RSV RNA SPEC QL NAA+PROBE: NEGATIVE
SARS-COV-2 RNA RESP QL NAA+PROBE: NEGATIVE

## 2024-01-15 PROCEDURE — 99214 OFFICE O/P EST MOD 30 MIN: CPT | Performed by: FAMILY MEDICINE

## 2024-01-15 PROCEDURE — 0241U POCT CEPHEID COV-2, FLU A/B, RSV - PCR: CPT | Performed by: FAMILY MEDICINE

## 2024-01-15 PROCEDURE — 3074F SYST BP LT 130 MM HG: CPT | Performed by: FAMILY MEDICINE

## 2024-01-15 PROCEDURE — 3078F DIAST BP <80 MM HG: CPT | Performed by: FAMILY MEDICINE

## 2024-01-15 RX ORDER — AMOXICILLIN AND CLAVULANATE POTASSIUM 875; 125 MG/1; MG/1
1 TABLET, FILM COATED ORAL 2 TIMES DAILY
Qty: 20 TABLET | Refills: 0 | Status: SHIPPED | OUTPATIENT
Start: 2024-01-15

## 2024-01-17 NOTE — PROGRESS NOTES
Subjective:     HPI:   Breanne Andrea is a 43 y.o. female here to discuss the evaluation and management of:  Chief Complaint   Patient presents with    URI     Last Monday. Cough congestion, Friday started a fever, now has rash      Rash     She reports that a week ago she had sinus symptoms that started off with a fever then over the last few days she has had bodyaches chills bad cough nasal congestion and a rash on her torso    She does have history of eczema  She tested negative for COVID    She does have a gynecologist            Objective:     /76   Pulse 90   Temp 37.1 °C (98.7 °F)   Resp 14   SpO2 98%      Physical Exam:  Physical Exam  Constitutional:       General: She is not in acute distress.     Appearance: Normal appearance. She is normal weight.   HENT:      Head: Normocephalic and atraumatic.      Right Ear: Tympanic membrane normal.      Left Ear: Tympanic membrane normal.      Mouth/Throat:      Mouth: Mucous membranes are moist.      Pharynx: Posterior oropharyngeal erythema present.   Eyes:      Conjunctiva/sclera: Conjunctivae normal.   Cardiovascular:      Rate and Rhythm: Normal rate and regular rhythm.      Heart sounds: Normal heart sounds.   Pulmonary:      Effort: Pulmonary effort is normal. No respiratory distress.      Breath sounds: Normal breath sounds. No stridor. No wheezing, rhonchi or rales.   Musculoskeletal:      Right lower leg: No edema.      Left lower leg: No edema.   Skin:     General: Skin is warm and dry.   Neurological:      General: No focal deficit present.      Mental Status: She is alert.   Psychiatric:         Mood and Affect: Mood normal.         Behavior: Behavior normal.        Assessment and Plan:     The following treatment plan was discussed:     1. Strep throat  Suspect rash is from strep  Augmentin x 10 days    2. Laboratory examination ordered as part of a routine general medical examination  Annual is due in April  - CBC WITH DIFFERENTIAL;  Future  - TSH; Future  - Lipid Profile; Future  - Comp Metabolic Panel; Future  - VITAMIN D 25-HYDROXY; Future  - VITAMIN B12; Future  - HEMOGLOBIN A1C; Future        Medical Decision Making: Today's Assessment/Status/Plan:                  Any change or worsening of signs or symptoms, patient encouraged to follow-up or report to emergency room for further evaluation. Patient verbalizes understanding and agrees.    Follow-Up: No follow-ups on file.      PLEASE NOTE: This dictation was created using voice recognition software. I have made every reasonable attempt to correct obvious errors, but I expect that there are errors of grammar and possibly content that I did not discover before finalizing the note.    My total time spent caring for the patient on the day of the encounter was  greater than 30 minutes.   This includes obtaining history, reviewing chart, physical exam, patient education, reviewing outside records, placing orders, interpreting tests and coordinating care.

## 2024-02-06 RX ORDER — TOPIRAMATE 50 MG/1
TABLET, FILM COATED ORAL
Qty: 180 TABLET | Refills: 1 | Status: SHIPPED | OUTPATIENT
Start: 2024-02-06

## 2024-03-28 RX ORDER — DESOGESTREL AND ETHINYL ESTRADIOL 21-5 (28)
KIT ORAL
Qty: 112 TABLET | Refills: 3 | Status: SHIPPED | OUTPATIENT
Start: 2024-03-28

## 2024-04-02 ENCOUNTER — PATIENT MESSAGE (OUTPATIENT)
Dept: INTERNAL MEDICINE | Facility: IMAGING CENTER | Age: 44
End: 2024-04-02
Payer: COMMERCIAL

## 2024-04-02 DIAGNOSIS — R92.8 ABNORMAL MAMMOGRAM OF LEFT BREAST: ICD-10-CM

## 2024-04-02 DIAGNOSIS — N60.02 BREAST CYST, LEFT: ICD-10-CM

## 2024-04-26 ENCOUNTER — APPOINTMENT (OUTPATIENT)
Dept: RADIOLOGY | Facility: MEDICAL CENTER | Age: 44
End: 2024-04-26
Attending: FAMILY MEDICINE
Payer: COMMERCIAL

## 2024-04-26 DIAGNOSIS — Z12.31 VISIT FOR SCREENING MAMMOGRAM: ICD-10-CM

## 2024-05-01 ENCOUNTER — HOSPITAL ENCOUNTER (OUTPATIENT)
Dept: RADIOLOGY | Facility: MEDICAL CENTER | Age: 44
End: 2024-05-01
Attending: FAMILY MEDICINE
Payer: COMMERCIAL

## 2024-05-01 DIAGNOSIS — N60.02 BREAST CYST, LEFT: ICD-10-CM

## 2024-05-01 DIAGNOSIS — R92.8 ABNORMAL MAMMOGRAM OF LEFT BREAST: ICD-10-CM

## 2024-05-08 ENCOUNTER — HOSPITAL ENCOUNTER (OUTPATIENT)
Facility: MEDICAL CENTER | Age: 44
End: 2024-05-08
Attending: OBSTETRICS & GYNECOLOGY
Payer: COMMERCIAL

## 2024-05-09 LAB
AMBIGUOUS DTTM AMBI4: NORMAL
PATHOLOGY CONSULT NOTE: NORMAL

## 2024-05-30 RX ORDER — NARATRIPTAN 2.5 MG/1
TABLET ORAL
Qty: 18 TABLET | Refills: 2 | Status: SHIPPED | OUTPATIENT
Start: 2024-05-30

## 2024-06-03 RX ORDER — NARATRIPTAN 2.5 MG/1
TABLET ORAL
Qty: 54 TABLET | Refills: 1 | Status: SHIPPED | OUTPATIENT
Start: 2024-06-03

## 2024-06-03 RX ORDER — NARATRIPTAN 2.5 MG/1
TABLET ORAL
Qty: 18 TABLET | Refills: 2 | Status: SHIPPED | OUTPATIENT
Start: 2024-06-03 | End: 2024-06-03 | Stop reason: SDUPTHER

## 2024-07-06 ENCOUNTER — OFFICE VISIT (OUTPATIENT)
Dept: URGENT CARE | Facility: CLINIC | Age: 44
End: 2024-07-06
Payer: COMMERCIAL

## 2024-07-06 VITALS
SYSTOLIC BLOOD PRESSURE: 132 MMHG | WEIGHT: 145 LBS | DIASTOLIC BLOOD PRESSURE: 82 MMHG | TEMPERATURE: 98.1 F | BODY MASS INDEX: 24.16 KG/M2 | RESPIRATION RATE: 16 BRPM | HEART RATE: 77 BPM | HEIGHT: 65 IN | OXYGEN SATURATION: 99 %

## 2024-07-06 DIAGNOSIS — S67.10XA CRUSHING INJURY OF FINGER, INITIAL ENCOUNTER: ICD-10-CM

## 2024-07-06 PROCEDURE — 99213 OFFICE O/P EST LOW 20 MIN: CPT | Performed by: FAMILY MEDICINE

## 2024-07-06 PROCEDURE — 3079F DIAST BP 80-89 MM HG: CPT | Performed by: FAMILY MEDICINE

## 2024-07-06 PROCEDURE — 3075F SYST BP GE 130 - 139MM HG: CPT | Performed by: FAMILY MEDICINE

## 2024-07-06 ASSESSMENT — FIBROSIS 4 INDEX: FIB4 SCORE: 0.48

## 2024-07-06 ASSESSMENT — ENCOUNTER SYMPTOMS: FEVER: 0

## 2024-07-08 ENCOUNTER — APPOINTMENT (OUTPATIENT)
Dept: RADIOLOGY | Facility: MEDICAL CENTER | Age: 44
End: 2024-07-08
Attending: FAMILY MEDICINE
Payer: COMMERCIAL

## 2024-07-08 DIAGNOSIS — S67.10XA CRUSHING INJURY OF FINGER, INITIAL ENCOUNTER: ICD-10-CM

## 2024-07-08 PROCEDURE — 73140 X-RAY EXAM OF FINGER(S): CPT | Mod: LT

## 2024-08-07 ENCOUNTER — NON-PROVIDER VISIT (OUTPATIENT)
Dept: INTERNAL MEDICINE | Facility: IMAGING CENTER | Age: 44
End: 2024-08-07
Payer: COMMERCIAL

## 2024-08-07 ENCOUNTER — HOSPITAL ENCOUNTER (OUTPATIENT)
Facility: MEDICAL CENTER | Age: 44
End: 2024-08-07
Attending: FAMILY MEDICINE
Payer: COMMERCIAL

## 2024-08-07 DIAGNOSIS — R30.0 DYSURIA: ICD-10-CM

## 2024-08-07 PROCEDURE — 87186 SC STD MICRODIL/AGAR DIL: CPT

## 2024-08-07 PROCEDURE — 87086 URINE CULTURE/COLONY COUNT: CPT

## 2024-08-07 PROCEDURE — 87077 CULTURE AEROBIC IDENTIFY: CPT

## 2024-08-07 PROCEDURE — 81001 URINALYSIS AUTO W/SCOPE: CPT

## 2024-08-08 LAB
APPEARANCE UR: CLEAR
BACTERIA #/AREA URNS HPF: ABNORMAL /HPF
BILIRUB UR QL STRIP.AUTO: NEGATIVE
COLOR UR: ABNORMAL
EPI CELLS #/AREA URNS HPF: NEGATIVE /HPF
GLUCOSE UR STRIP.AUTO-MCNC: NEGATIVE MG/DL
HYALINE CASTS #/AREA URNS LPF: ABNORMAL /LPF
KETONES UR STRIP.AUTO-MCNC: NEGATIVE MG/DL
LEUKOCYTE ESTERASE UR QL STRIP.AUTO: ABNORMAL
MICRO URNS: ABNORMAL
NITRITE UR QL STRIP.AUTO: POSITIVE
PH UR STRIP.AUTO: 6 [PH] (ref 5–8)
PROT UR QL STRIP: NEGATIVE MG/DL
RBC # URNS HPF: ABNORMAL /HPF
RBC UR QL AUTO: NEGATIVE
SP GR UR STRIP.AUTO: 1.01
UROBILINOGEN UR STRIP.AUTO-MCNC: 1 MG/DL
WBC #/AREA URNS HPF: ABNORMAL /HPF

## 2024-08-08 RX ORDER — CEPHALEXIN 500 MG/1
500 CAPSULE ORAL 3 TIMES DAILY
Qty: 15 CAPSULE | Refills: 0 | Status: SHIPPED | OUTPATIENT
Start: 2024-08-08 | End: 2024-08-13

## 2024-08-09 RX ORDER — TOPIRAMATE 50 MG/1
TABLET, FILM COATED ORAL
Qty: 180 TABLET | Refills: 1 | Status: SHIPPED | OUTPATIENT
Start: 2024-08-09

## 2024-08-28 ENCOUNTER — APPOINTMENT (OUTPATIENT)
Dept: INTERNAL MEDICINE | Facility: IMAGING CENTER | Age: 44
End: 2024-08-28
Payer: COMMERCIAL

## 2024-09-04 ENCOUNTER — NON-PROVIDER VISIT (OUTPATIENT)
Dept: INTERNAL MEDICINE | Facility: IMAGING CENTER | Age: 44
End: 2024-09-04
Payer: COMMERCIAL

## 2024-09-04 ENCOUNTER — OFFICE VISIT (OUTPATIENT)
Dept: INTERNAL MEDICINE | Facility: IMAGING CENTER | Age: 44
End: 2024-09-04
Payer: COMMERCIAL

## 2024-09-04 VITALS
OXYGEN SATURATION: 94 % | HEART RATE: 110 BPM | RESPIRATION RATE: 16 BRPM | TEMPERATURE: 97.4 F | DIASTOLIC BLOOD PRESSURE: 74 MMHG | SYSTOLIC BLOOD PRESSURE: 122 MMHG

## 2024-09-04 DIAGNOSIS — R05.1 ACUTE COUGH: ICD-10-CM

## 2024-09-04 DIAGNOSIS — J01.10 ACUTE NON-RECURRENT FRONTAL SINUSITIS: ICD-10-CM

## 2024-09-04 DIAGNOSIS — F33.40 RECURRENT MAJOR DEPRESSIVE DISORDER, IN REMISSION (HCC): ICD-10-CM

## 2024-09-04 PROCEDURE — 99214 OFFICE O/P EST MOD 30 MIN: CPT | Performed by: FAMILY MEDICINE

## 2024-09-04 PROCEDURE — 0241U POCT CEPHEID COV-2, FLU A/B, RSV - PCR: CPT | Performed by: FAMILY MEDICINE

## 2024-09-04 RX ORDER — VORTIOXETINE 10 MG/1
10 TABLET, FILM COATED ORAL
COMMUNITY
Start: 2024-08-27

## 2024-09-04 RX ORDER — FLUCONAZOLE 150 MG/1
150 TABLET ORAL DAILY
Qty: 1 TABLET | Refills: 1 | Status: SHIPPED | OUTPATIENT
Start: 2024-09-04 | End: 2024-10-09 | Stop reason: SDUPTHER

## 2024-09-04 NOTE — PROGRESS NOTES
Verbal consent was acquired by the patient to use [employer] ambient listening note generation during this visit     Patient is a 44 y.o.female.established patient      History of Present Illness  The patient presents for evaluation of a sore throat.    She experienced a mild fever, ranging from 99 to 100 degrees, which lasted for a day. Her sore throat began 10 d ago , initially worsened  and started to improve on day 3. However, her condition deteriorated again on say 4  and has been persisting since then. She lost her voice on Sunday and Monday. She has been producing yellowish-green drainage and suspects a sinus infection. She also reports some itching and mild pain in her ears, along with sinus pressure at the front. She reports no toothache or gastrointestinal issues. She has no history of asthma.    She recently completed a course of cefuroxime for a urinary tract infection (UTI) and is concerned about developing a yeast infection, as she often does when taking antibiotics.    She had scarlet fever in January 2024. She is under the care of a gynecologist and had a normal Pap smear in January 2024, although she has had abnormal results in the past. She is scheduled for another Pap smear in January 2025.    She is currently seeing a therapist who manages her medications. She was recently switched from Trintellix 20 mg to 10 mg and continues to take Wellbutrin 200 mg twice daily. She is also on birth control and takes Topamax daily for migraines. Her lab work, originally scheduled for last week, has been postponed to October 2024.    ALLERGIES  The patient has no known drug allergies.            There were no vitals taken for this visit., There is no height or weight on file to calculate BMI.    Physical Exam      Physical Exam  Constitutional:       General: She is not in acute distress.     Appearance: Normal appearance. She is not ill-appearing, toxic-appearing or diaphoretic.   HENT:      Head:  Normocephalic and atraumatic.      Right Ear: Tympanic membrane normal.      Left Ear: Tympanic membrane normal.      Nose: Nose normal.      Mouth/Throat:      Pharynx: Oropharynx is clear.   Eyes:      Conjunctiva/sclera: Conjunctivae normal.   Cardiovascular:      Rate and Rhythm: Normal rate.   Pulmonary:      Effort: Pulmonary effort is normal. No respiratory distress.      Breath sounds: Normal breath sounds. No wheezing.   Musculoskeletal:      Cervical back: Neck supple.   Lymphadenopathy:      Cervical: Cervical adenopathy present.   Neurological:      Mental Status: She is alert and oriented to person, place, and time. Mental status is at baseline.   Psychiatric:         Mood and Affect: Mood normal.         Behavior: Behavior normal.         Thought Content: Thought content normal.         Judgment: Judgment normal.           Results            Assessment & Plan  1. Sinus Infection.  Symptoms include sore throat, yellowish-green drainage, sinus pressure, and mild fever. Examination revealed lymph node enlargement and no asthma history. Augmentin (amoxicillin/clavulanate) 875 mg twice a day with food has been prescribed. Diflucan (fluconazole) 150 mg has also been prescribed to manage potential yeast infection due to antibiotic use. She is advised to notify if symptoms do not improve.    2. Medication Management.  She requested a refill for Diflucan due to a history of yeast infections when taking antibiotics. She is currently on Trintellix 10 mg daily, Wellbutrin 200 mg twice a day, and Topamax for migraines. The Trintellix dosage was recently reduced from 20 mg to 10 mg. The Wellbutrin and Topamax prescriptions remain unchanged.                   This note was created using voice recognition software (Dragon). The accuracy of the dictation is limited by the abilities of the software. I have reviewed the note prior to signing, however some errors in grammar and context are still possible. If you have any  questions related to this note please do not hesitate to contact our office.

## 2024-10-04 ENCOUNTER — APPOINTMENT (OUTPATIENT)
Dept: INTERNAL MEDICINE | Facility: IMAGING CENTER | Age: 44
End: 2024-10-04
Payer: COMMERCIAL

## 2024-10-04 ENCOUNTER — HOSPITAL ENCOUNTER (OUTPATIENT)
Facility: MEDICAL CENTER | Age: 44
End: 2024-10-04
Attending: FAMILY MEDICINE
Payer: COMMERCIAL

## 2024-10-04 DIAGNOSIS — Z00.00 LABORATORY EXAMINATION ORDERED AS PART OF A ROUTINE GENERAL MEDICAL EXAMINATION: ICD-10-CM

## 2024-10-04 LAB
25(OH)D3 SERPL-MCNC: 51 NG/ML (ref 30–100)
ALBUMIN SERPL BCP-MCNC: 4 G/DL (ref 3.2–4.9)
ALBUMIN/GLOB SERPL: 1.1 G/DL
ALP SERPL-CCNC: 62 U/L (ref 30–99)
ALT SERPL-CCNC: 18 U/L (ref 2–50)
ANION GAP SERPL CALC-SCNC: 13 MMOL/L (ref 7–16)
AST SERPL-CCNC: 17 U/L (ref 12–45)
BASOPHILS # BLD AUTO: 0.7 % (ref 0–1.8)
BASOPHILS # BLD: 0.05 K/UL (ref 0–0.12)
BILIRUB SERPL-MCNC: 0.4 MG/DL (ref 0.1–1.5)
BUN SERPL-MCNC: 13 MG/DL (ref 8–22)
CALCIUM ALBUM COR SERPL-MCNC: 9 MG/DL (ref 8.5–10.5)
CALCIUM SERPL-MCNC: 9 MG/DL (ref 8.5–10.5)
CHLORIDE SERPL-SCNC: 106 MMOL/L (ref 96–112)
CHOLEST SERPL-MCNC: 183 MG/DL (ref 100–199)
CO2 SERPL-SCNC: 19 MMOL/L (ref 20–33)
CREAT SERPL-MCNC: 0.95 MG/DL (ref 0.5–1.4)
EOSINOPHIL # BLD AUTO: 0.18 K/UL (ref 0–0.51)
EOSINOPHIL NFR BLD: 2.4 % (ref 0–6.9)
ERYTHROCYTE [DISTWIDTH] IN BLOOD BY AUTOMATED COUNT: 44.3 FL (ref 35.9–50)
EST. AVERAGE GLUCOSE BLD GHB EST-MCNC: 80 MG/DL
GFR SERPLBLD CREATININE-BSD FMLA CKD-EPI: 76 ML/MIN/1.73 M 2
GLOBULIN SER CALC-MCNC: 3.7 G/DL (ref 1.9–3.5)
GLUCOSE SERPL-MCNC: 83 MG/DL (ref 65–99)
HBA1C MFR BLD: 4.4 % (ref 4–5.6)
HCT VFR BLD AUTO: 42.7 % (ref 37–47)
HDLC SERPL-MCNC: 76 MG/DL
HGB BLD-MCNC: 13.9 G/DL (ref 12–16)
IMM GRANULOCYTES # BLD AUTO: 0.02 K/UL (ref 0–0.11)
IMM GRANULOCYTES NFR BLD AUTO: 0.3 % (ref 0–0.9)
LDLC SERPL CALC-MCNC: 89 MG/DL
LYMPHOCYTES # BLD AUTO: 2.65 K/UL (ref 1–4.8)
LYMPHOCYTES NFR BLD: 35.4 % (ref 22–41)
MCH RBC QN AUTO: 30.7 PG (ref 27–33)
MCHC RBC AUTO-ENTMCNC: 32.6 G/DL (ref 32.2–35.5)
MCV RBC AUTO: 94.3 FL (ref 81.4–97.8)
MONOCYTES # BLD AUTO: 0.38 K/UL (ref 0–0.85)
MONOCYTES NFR BLD AUTO: 5.1 % (ref 0–13.4)
NEUTROPHILS # BLD AUTO: 4.2 K/UL (ref 1.82–7.42)
NEUTROPHILS NFR BLD: 56.1 % (ref 44–72)
NRBC # BLD AUTO: 0 K/UL
NRBC BLD-RTO: 0 /100 WBC (ref 0–0.2)
PLATELET # BLD AUTO: 372 K/UL (ref 164–446)
PMV BLD AUTO: 9.8 FL (ref 9–12.9)
POTASSIUM SERPL-SCNC: 3.9 MMOL/L (ref 3.6–5.5)
PROT SERPL-MCNC: 7.7 G/DL (ref 6–8.2)
RBC # BLD AUTO: 4.53 M/UL (ref 4.2–5.4)
SODIUM SERPL-SCNC: 138 MMOL/L (ref 135–145)
TRIGL SERPL-MCNC: 92 MG/DL (ref 0–149)
TSH SERPL-ACNC: 1.5 UIU/ML (ref 0.35–5.5)
VIT B12 SERPL-MCNC: 1963 PG/ML (ref 211–911)
WBC # BLD AUTO: 7.5 K/UL (ref 4.8–10.8)

## 2024-10-04 PROCEDURE — 80053 COMPREHEN METABOLIC PANEL: CPT

## 2024-10-04 PROCEDURE — 82306 VITAMIN D 25 HYDROXY: CPT

## 2024-10-04 PROCEDURE — 82607 VITAMIN B-12: CPT

## 2024-10-04 PROCEDURE — 80061 LIPID PANEL: CPT

## 2024-10-04 PROCEDURE — 85025 COMPLETE CBC W/AUTO DIFF WBC: CPT

## 2024-10-04 PROCEDURE — 84443 ASSAY THYROID STIM HORMONE: CPT

## 2024-10-04 PROCEDURE — 83036 HEMOGLOBIN GLYCOSYLATED A1C: CPT

## 2024-10-07 ENCOUNTER — HOSPITAL ENCOUNTER (OUTPATIENT)
Facility: MEDICAL CENTER | Age: 44
End: 2024-10-07
Attending: FAMILY MEDICINE
Payer: COMMERCIAL

## 2024-10-07 ENCOUNTER — NON-PROVIDER VISIT (OUTPATIENT)
Dept: INTERNAL MEDICINE | Facility: IMAGING CENTER | Age: 44
End: 2024-10-07
Payer: COMMERCIAL

## 2024-10-07 DIAGNOSIS — R30.0 DYSURIA: ICD-10-CM

## 2024-10-07 LAB
APPEARANCE UR: CLEAR
BACTERIA #/AREA URNS HPF: ABNORMAL /HPF
BILIRUB UR QL STRIP.AUTO: NEGATIVE
COLOR UR: ABNORMAL
EPI CELLS #/AREA URNS HPF: NEGATIVE /HPF
GLUCOSE UR STRIP.AUTO-MCNC: NEGATIVE MG/DL
HYALINE CASTS #/AREA URNS LPF: ABNORMAL /LPF
KETONES UR STRIP.AUTO-MCNC: NEGATIVE MG/DL
LEUKOCYTE ESTERASE UR QL STRIP.AUTO: ABNORMAL
MICRO URNS: ABNORMAL
NITRITE UR QL STRIP.AUTO: POSITIVE
PH UR STRIP.AUTO: 7 [PH] (ref 5–8)
PROT UR QL STRIP: NEGATIVE MG/DL
RBC # URNS HPF: ABNORMAL /HPF
RBC UR QL AUTO: ABNORMAL
SP GR UR STRIP.AUTO: 1.01
UROBILINOGEN UR STRIP.AUTO-MCNC: 1 MG/DL
WBC #/AREA URNS HPF: ABNORMAL /HPF

## 2024-10-07 PROCEDURE — 87186 SC STD MICRODIL/AGAR DIL: CPT

## 2024-10-07 PROCEDURE — 87077 CULTURE AEROBIC IDENTIFY: CPT

## 2024-10-07 PROCEDURE — 87086 URINE CULTURE/COLONY COUNT: CPT

## 2024-10-07 PROCEDURE — 81001 URINALYSIS AUTO W/SCOPE: CPT

## 2024-10-08 ENCOUNTER — TELEPHONE (OUTPATIENT)
Dept: INTERNAL MEDICINE | Facility: IMAGING CENTER | Age: 44
End: 2024-10-08
Payer: COMMERCIAL

## 2024-10-08 RX ORDER — NITROFURANTOIN 25; 75 MG/1; MG/1
100 CAPSULE ORAL 2 TIMES DAILY
Qty: 10 CAPSULE | Refills: 0 | Status: SHIPPED | OUTPATIENT
Start: 2024-10-08 | End: 2024-10-13

## 2024-10-09 RX ORDER — FLUCONAZOLE 150 MG/1
150 TABLET ORAL DAILY
Qty: 1 TABLET | Refills: 1 | Status: SHIPPED | OUTPATIENT
Start: 2024-10-09 | End: 2024-10-11

## 2024-10-11 ENCOUNTER — NON-PROVIDER VISIT (OUTPATIENT)
Dept: INTERNAL MEDICINE | Facility: IMAGING CENTER | Age: 44
End: 2024-10-11
Payer: COMMERCIAL

## 2024-10-11 ENCOUNTER — APPOINTMENT (OUTPATIENT)
Dept: INTERNAL MEDICINE | Facility: IMAGING CENTER | Age: 44
End: 2024-10-11
Payer: COMMERCIAL

## 2024-10-11 ENCOUNTER — HOSPITAL ENCOUNTER (OUTPATIENT)
Facility: MEDICAL CENTER | Age: 44
End: 2024-10-11
Attending: FAMILY MEDICINE
Payer: COMMERCIAL

## 2024-10-11 VITALS
HEART RATE: 95 BPM | BODY MASS INDEX: 24.16 KG/M2 | WEIGHT: 145 LBS | DIASTOLIC BLOOD PRESSURE: 70 MMHG | OXYGEN SATURATION: 95 % | SYSTOLIC BLOOD PRESSURE: 128 MMHG | TEMPERATURE: 98.4 F | HEIGHT: 65 IN | RESPIRATION RATE: 14 BRPM

## 2024-10-11 DIAGNOSIS — J02.0 ACUTE STREPTOCOCCAL PHARYNGITIS: ICD-10-CM

## 2024-10-11 DIAGNOSIS — J02.9 PHARYNGITIS, UNSPECIFIED ETIOLOGY: ICD-10-CM

## 2024-10-11 DIAGNOSIS — J06.9 UPPER RESPIRATORY TRACT INFECTION, UNSPECIFIED TYPE: ICD-10-CM

## 2024-10-11 LAB
FLUAV RNA SPEC QL NAA+PROBE: NEGATIVE
FLUBV RNA SPEC QL NAA+PROBE: NEGATIVE
RSV RNA SPEC QL NAA+PROBE: NEGATIVE
S PYO DNA SPEC NAA+PROBE: NOT DETECTED
SARS-COV-2 RNA RESP QL NAA+PROBE: NEGATIVE

## 2024-10-11 PROCEDURE — 87651 STREP A DNA AMP PROBE: CPT

## 2024-10-11 PROCEDURE — 3074F SYST BP LT 130 MM HG: CPT | Performed by: FAMILY MEDICINE

## 2024-10-11 PROCEDURE — 3078F DIAST BP <80 MM HG: CPT | Performed by: FAMILY MEDICINE

## 2024-10-11 PROCEDURE — 99213 OFFICE O/P EST LOW 20 MIN: CPT | Performed by: FAMILY MEDICINE

## 2024-10-11 PROCEDURE — 87070 CULTURE OTHR SPECIMN AEROBIC: CPT

## 2024-10-11 PROCEDURE — 0241U POCT CEPHEID COV-2, FLU A/B, RSV - PCR: CPT

## 2024-10-11 RX ORDER — SULFAMETHOXAZOLE AND TRIMETHOPRIM 800; 160 MG/1; MG/1
1 TABLET ORAL 2 TIMES DAILY
Qty: 20 TABLET | Refills: 0 | Status: SHIPPED | OUTPATIENT
Start: 2024-10-11

## 2024-10-11 RX ORDER — FLUCONAZOLE 100 MG/1
100 TABLET ORAL DAILY
Qty: 5 TABLET | Refills: 0 | Status: SHIPPED | OUTPATIENT
Start: 2024-10-11

## 2024-10-11 RX ORDER — PREDNISONE 20 MG/1
20 TABLET ORAL DAILY
Qty: 10 TABLET | Refills: 0 | Status: SHIPPED | OUTPATIENT
Start: 2024-10-11

## 2024-10-11 ASSESSMENT — FIBROSIS 4 INDEX: FIB4 SCORE: 0.45

## 2024-10-13 LAB
BACTERIA SPEC RESP CULT: NORMAL
SIGNIFICANT IND 70042: NORMAL
SITE SITE: NORMAL
SOURCE SOURCE: NORMAL

## 2024-11-11 ENCOUNTER — NON-PROVIDER VISIT (OUTPATIENT)
Dept: INTERNAL MEDICINE | Facility: IMAGING CENTER | Age: 44
End: 2024-11-11
Payer: COMMERCIAL

## 2024-11-11 ENCOUNTER — HOSPITAL ENCOUNTER (OUTPATIENT)
Facility: MEDICAL CENTER | Age: 44
End: 2024-11-11
Attending: FAMILY MEDICINE
Payer: COMMERCIAL

## 2024-11-11 DIAGNOSIS — R39.15 URINARY URGENCY: ICD-10-CM

## 2024-11-11 DIAGNOSIS — R30.0 DYSURIA: ICD-10-CM

## 2024-11-11 LAB
APPEARANCE UR: CLEAR
BACTERIA #/AREA URNS HPF: ABNORMAL /HPF
BILIRUB UR QL STRIP.AUTO: NEGATIVE
CASTS URNS QL MICRO: ABNORMAL /LPF (ref 0–2)
COLOR UR: YELLOW
EPITHELIAL CELLS 1715: ABNORMAL /HPF (ref 0–5)
GLUCOSE UR STRIP.AUTO-MCNC: NEGATIVE MG/DL
KETONES UR STRIP.AUTO-MCNC: NEGATIVE MG/DL
LEUKOCYTE ESTERASE UR QL STRIP.AUTO: ABNORMAL
MICRO URNS: ABNORMAL
NITRITE UR QL STRIP.AUTO: NEGATIVE
PH UR STRIP.AUTO: 7 [PH] (ref 5–8)
PROT UR QL STRIP: NEGATIVE MG/DL
RBC # URNS HPF: ABNORMAL /HPF (ref 0–2)
RBC UR QL AUTO: NEGATIVE
SP GR UR STRIP.AUTO: 1
UROBILINOGEN UR STRIP.AUTO-MCNC: 1 EU/DL
WBC #/AREA URNS HPF: ABNORMAL /HPF

## 2024-11-11 PROCEDURE — 81001 URINALYSIS AUTO W/SCOPE: CPT

## 2024-11-11 PROCEDURE — 87086 URINE CULTURE/COLONY COUNT: CPT

## 2024-11-11 PROCEDURE — 99999 PR NO CHARGE: CPT

## 2024-11-14 LAB
BACTERIA UR CULT: NORMAL
SIGNIFICANT IND 70042: NORMAL
SITE SITE: NORMAL
SOURCE SOURCE: NORMAL

## 2025-01-13 ENCOUNTER — HOSPITAL ENCOUNTER (OUTPATIENT)
Facility: MEDICAL CENTER | Age: 45
End: 2025-01-13
Attending: OBSTETRICS & GYNECOLOGY
Payer: COMMERCIAL

## 2025-01-13 LAB — PATHOLOGY CONSULT NOTE: NORMAL

## 2025-01-13 PROCEDURE — 88305 TISSUE EXAM BY PATHOLOGIST: CPT | Mod: 26 | Performed by: PATHOLOGY

## 2025-01-13 PROCEDURE — 88305 TISSUE EXAM BY PATHOLOGIST: CPT | Performed by: PATHOLOGY

## 2025-01-30 ENCOUNTER — HOSPITAL ENCOUNTER (OUTPATIENT)
Facility: MEDICAL CENTER | Age: 45
End: 2025-01-30
Attending: FAMILY MEDICINE
Payer: COMMERCIAL

## 2025-01-30 ENCOUNTER — PATIENT MESSAGE (OUTPATIENT)
Dept: INTERNAL MEDICINE | Facility: IMAGING CENTER | Age: 45
End: 2025-01-30
Payer: COMMERCIAL

## 2025-01-30 DIAGNOSIS — G43.809 OTHER MIGRAINE WITHOUT STATUS MIGRAINOSUS, NOT INTRACTABLE: ICD-10-CM

## 2025-01-30 PROCEDURE — 87086 URINE CULTURE/COLONY COUNT: CPT

## 2025-01-30 PROCEDURE — 81001 URINALYSIS AUTO W/SCOPE: CPT

## 2025-01-31 ENCOUNTER — NON-PROVIDER VISIT (OUTPATIENT)
Dept: INTERNAL MEDICINE | Facility: IMAGING CENTER | Age: 45
End: 2025-01-31
Payer: COMMERCIAL

## 2025-01-31 DIAGNOSIS — R30.0 DYSURIA: ICD-10-CM

## 2025-01-31 LAB
APPEARANCE UR: CLEAR
BACTERIA #/AREA URNS HPF: ABNORMAL /HPF
BILIRUB UR QL STRIP.AUTO: NEGATIVE
CASTS URNS QL MICRO: ABNORMAL /LPF (ref 0–2)
COLOR UR: YELLOW
EPITHELIAL CELLS 1715: ABNORMAL /HPF (ref 0–5)
GLUCOSE UR STRIP.AUTO-MCNC: NEGATIVE MG/DL
KETONES UR STRIP.AUTO-MCNC: NEGATIVE MG/DL
LEUKOCYTE ESTERASE UR QL STRIP.AUTO: ABNORMAL
MICRO URNS: ABNORMAL
NITRITE UR QL STRIP.AUTO: NEGATIVE
PH UR STRIP.AUTO: 5.5 [PH] (ref 5–8)
PROT UR QL STRIP: NEGATIVE MG/DL
RBC # URNS HPF: ABNORMAL /HPF (ref 0–2)
RBC UR QL AUTO: ABNORMAL
SP GR UR STRIP.AUTO: 1.02
UROBILINOGEN UR STRIP.AUTO-MCNC: 1 EU/DL
WBC #/AREA URNS HPF: ABNORMAL /HPF

## 2025-02-02 LAB
BACTERIA UR CULT: NORMAL
SIGNIFICANT IND 70042: NORMAL
SITE SITE: NORMAL
SOURCE SOURCE: NORMAL

## 2025-02-10 RX ORDER — TOPIRAMATE 50 MG/1
TABLET, FILM COATED ORAL
Qty: 180 TABLET | Refills: 1 | Status: SHIPPED | OUTPATIENT
Start: 2025-02-10

## 2025-02-11 ENCOUNTER — TELEPHONE (OUTPATIENT)
Dept: HEALTH INFORMATION MANAGEMENT | Facility: OTHER | Age: 45
End: 2025-02-11
Payer: COMMERCIAL

## 2025-04-18 ENCOUNTER — OFFICE VISIT (OUTPATIENT)
Dept: NEUROLOGY | Facility: MEDICAL CENTER | Age: 45
End: 2025-04-18
Payer: COMMERCIAL

## 2025-04-18 VITALS
DIASTOLIC BLOOD PRESSURE: 70 MMHG | OXYGEN SATURATION: 96 % | TEMPERATURE: 98.7 F | RESPIRATION RATE: 16 BRPM | SYSTOLIC BLOOD PRESSURE: 110 MMHG | BODY MASS INDEX: 24.98 KG/M2 | HEIGHT: 65 IN | WEIGHT: 149.91 LBS | HEART RATE: 94 BPM

## 2025-04-18 DIAGNOSIS — G43.109 MIGRAINE WITH AURA AND WITHOUT STATUS MIGRAINOSUS, NOT INTRACTABLE: ICD-10-CM

## 2025-04-18 PROCEDURE — 99212 OFFICE O/P EST SF 10 MIN: CPT

## 2025-04-18 PROCEDURE — 3078F DIAST BP <80 MM HG: CPT

## 2025-04-18 PROCEDURE — 3074F SYST BP LT 130 MM HG: CPT

## 2025-04-18 PROCEDURE — 99205 OFFICE O/P NEW HI 60 MIN: CPT

## 2025-04-18 RX ORDER — ELETRIPTAN HYDROBROMIDE 40 MG/1
40 TABLET, FILM COATED ORAL
Qty: 9 TABLET | Refills: 7 | OUTPATIENT
Start: 2025-04-18

## 2025-04-18 RX ORDER — PROGESTERONE 200 MG/1
CAPSULE ORAL
COMMUNITY
Start: 2025-04-11

## 2025-04-18 RX ORDER — ELETRIPTAN HYDROBROMIDE 40 MG/1
40 TABLET, FILM COATED ORAL
Qty: 9 TABLET | Refills: 7 | Status: SHIPPED | OUTPATIENT
Start: 2025-04-18 | End: 2025-04-18

## 2025-04-18 RX ORDER — ELETRIPTAN HYDROBROMIDE 40 MG/1
40 TABLET, FILM COATED ORAL
Qty: 6 TABLET | Refills: 7 | Status: SHIPPED | OUTPATIENT
Start: 2025-04-18

## 2025-04-18 RX ORDER — PROPRANOLOL HYDROCHLORIDE 60 MG/1
60 CAPSULE, EXTENDED RELEASE ORAL DAILY
Qty: 30 CAPSULE | Refills: 11 | Status: SHIPPED | OUTPATIENT
Start: 2025-04-18

## 2025-04-18 RX ORDER — ESTRADIOL 0.07 MG/D
FILM, EXTENDED RELEASE TRANSDERMAL
COMMUNITY
Start: 2025-04-10

## 2025-04-18 ASSESSMENT — PATIENT HEALTH QUESTIONNAIRE - PHQ9
CLINICAL INTERPRETATION OF PHQ2 SCORE: 2
SUM OF ALL RESPONSES TO PHQ QUESTIONS 1-9: 7
5. POOR APPETITE OR OVEREATING: 1 - SEVERAL DAYS

## 2025-04-18 ASSESSMENT — FIBROSIS 4 INDEX: FIB4 SCORE: 0.46

## 2025-04-18 NOTE — PROGRESS NOTES
[facility]  GENERAL NEUROLOGY  NEW PATIENT VISIT    Referral source: Dr. Celeste Patrick     CC: other migraine without status migrainosus, not intractable    HISTORY OF ILLNESS:  Breanne Andrea is a 45 y.o. woman with a history most notable for migraine.  She presented to her primary provider with continued complaints of migraines and constant headaches.  She was referred to neurology for further evaluation.  Today, Breanne provided the following history:    Migraine description:  Sometimes fatigue and irritable. She reports approximately 1 day before migraine starts. Migraine pain starts in the right frontal region and in the corner of the right eye then will radiate to the left side. Quality of migraine is described as stabbing, ice pick, and throbbing. Intensity of migraine without medication 5-10/10. Naratriptan 2-3/10 and does have to re-dose. Advil 4-10/10. Migraines can last for 1 days to 5 days. Post migraine hangover of fatigue and brain fog for one day. Frequency of migraine is once a week. Associated symptoms: rare light sensitivity, brain fog, difficulty with word finding, neck tension. Trigger: barometric pressure change, stress, dehydration, skipping meals, altitude change. Denies being able to trigger with sudden position change, bending, position change, laughing, sneezing.Can trigger with crying and coughing. Denies double vision. Migraines occur in the afternoon or evening. She does snore occasionally. She is tired after a full night sleep. She does grind her teeth and clenches her jaw at night wears a . She is sleepy throughout the day. She does occasionally startle herself awake but denies needing to take a deep breath. She reports that her dad has sleep apnea.     Headaches description:  Denies aura before headache. Headache occurs in the right frontal/temporal region. Can radiate to the left frontal region or the whole head. Quality of headache is ache. Intensity of headache  3/10. Advil 1/10. Headache can last 6 to 12 hours. Frequency of headache is 2-3 times a week. Associated symptoms: light sensitivity. Triggers: none. Denies being able to trigger with sudden position change, bending, position change, laughing, or sneezing. Can trigger with sneezing or crying. Denies double vision. Headaches occur more frequently in the afternoon or evening.     The following is a summary of headache symptoms, presented in my standard format:    Family History: mother   Age at onset (years): 30s  Location: see above   Radiation: see above   Frequency: see above   Duration: see above   Headache Days/Month: January 15/30, February 15/28, March 7/30, April 4/18  Quality: see above   Intensity: see above   Aura: see above   Photophobia/Phonophobia/Nausea/Vomiting: yes, no, no,no  Provoked by Physical Activity?:   Triggers: see above   Associated Symptoms: see above   Autonomic Signs (such as ptosis, miosis, conjunctival injection, rhinorrhea, increased lacrimation): no  Head Trauma: no   Association with Menses: yes  ED Visits: no  Hospitalizations: no  Missed Work Days (): power through  Sleep (hours/night): 7 hours of sleep   Caffeine Intake: 1 redbull in the morning   Hydration: yes  Nutrition: she does skip can trigger migraine  Exercise: does not trigger  Analgesic Overuse: Advil 2-3 tablets daily     Current Medication Regimen:  - Naratriptan  - Topamax 50 mg    Medications Tried: Response  Preventive:  -     Rescue:  -     Medications Not Tried:  -     MEDICAL AND SURGICAL HISTORY:  Past Medical History:   Diagnosis Date    Anxiety     Chronic eczema     Depression 02/18/2014    GERD (gastroesophageal reflux disease)     Migraine     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  MEDICATIONS:  Current Outpatient Medications   Medication Sig    topiramate (TOPAMAX) 50 MG tablet TAKE 1 TABLET BY MOUTH TWICE A DAY    sulfamethoxazole-trimethoprim (BACTRIM DS) 800-160 MG tablet Take 1 Tablet by mouth 2 times a day.    fluconazole (DIFLUCAN) 100 MG Tab Take 1 Tablet by mouth every day.    predniSONE (DELTASONE) 20 MG Tab Take 1 Tablet by mouth every day.    TRINTELLIX 10 MG tablet Take 10 mg by mouth every day.    naratriptan (AMERGE) 2.5 MG tablet TAKE 1 TABLET ONCE AS NEEDED FOR MIGRAINE FOR UP TO 1 DOSE. MAY REPEAT IN 2 HOURS IF NEEDED, MAXIMUM OF 2 DOSES IN 24 HOURS.    desogestrel-ethinyl estradiol (VOLNEA) 0.15-0.02/0.01 MG (21/5) per tablet TAKE 1 ACTIVE TABLET FOR 84 DAYS STRAIGHT THEN TAKE INACTIVE TABLETS FOR 7 DAYS AS DIRECTED    docusate sodium (COLACE) 100 MG Cap Take 100 mg by mouth every day.    buPROPion (WELLBUTRIN SR) 200 MG SR tablet Take 200 mg by mouth 2 times a day. Takes in the AM and Afternoon    ALPRAZolam (XANAX) 0.25 MG Tab Take 0.25 mg by mouth 1 time a day as needed for Anxiety.    vitamin D (CHOLECALCIFEROL) 1000 UNIT Tab Take 1,000 Units by mouth every day.    omeprazole (PRILOSEC) 20 MG CPDR Take 20 mg by mouth every day.     SOCIAL HISTORY:  Social History     Tobacco Use    Smoking status: Never    Smokeless tobacco: Never   Substance Use Topics    Alcohol use: Yes     Alcohol/week: 3.0 oz     Types: 5 Shots of liquor per week     Comment: occ     Social History     Social History Narrative    She works for [employer] as an     Currently  from , no children     FAMILY HISTORY:  Family History   Problem Relation Age of Onset    Cancer Mother         anal cancer , liver mets    Hypertension Mother     Heart Disease Father     Hyperlipidemia Father     Alcohol abuse Father     Drug abuse Father     Cancer Paternal Grandmother         GI    Stroke Maternal Grandmother     Heart Disease Paternal Grandfather        Ambulatory  "Vitals  Encounter Vitals  Temperature: 37.1 °C (98.7 °F)  Temp src: Temporal  Blood Pressure: 110/70  Pulse: 94  Respiration: 16  Pulse Oximetry: 96 %  Weight: 68 kg (149 lb 14.6 oz)  Height: 165.1 cm (5' 5\") (pt reported)  BMI (Calculated): 24.95       REVIEW OF SYSTEMS:  A ROS was completed.  Pertinent positives and negatives were included in the HPI, above.  All other systems were reviewed and are negative.    PHYSICAL EXAM:  General/Medical:  Breanne presents clean and well-dressed.  She does not appear in any acute distress at this time.  She has no malar rash.  She has good range of motion of her neck.  She reports no jaw claudication or jaw pain.  She reports no allodynia.  She has no upper or lower extremity edema.  She has palpable radial pulses.  She has good capillary refill in the upper extremities.  Auscultation of her heart revealed S1 and S2 at an increased rate with no other abnormal sounds.  Vital signs are listed above and are within normal limits.  Neuro:  MENTAL STATUS: awake and alert; no deficits of speech or language; oriented to person, place, and time; affect was appropriate to situation    CRANIAL NERVES:    II: acuity: J2/J1, fields: intact to confrontation, pupils: 3/3 to 2/2 without a relative afferent pupillary defect, discs: sharp    III/IV/VI: versions: intact without nystagmus    V: facial sensation: symmetric to light touch    VII: facial expression: symmetric    VIII: hearing: intact to finger rub    IX/X: palate: elevates symmetrically    XI: shoulder shrug: symmetric    XII: tongue: midline    MOTOR:  - bulk: normal throughout  - tone: normal throughout  Upper Extremity Strength  (R/L)    5/5   Elbow flexion 5/5   Elbow extension 5/5   Shoulder abduction 5/5     Lower Extremity Strength  (R/L)   Hip flexion 5/5   Knee extension 5/5   Knee flexion 5/5   Ankle plantarflexion 5/5   Ankle dorsiflexion 5/5     - pronator drift: absent  - abnormal movements: none    SENSATION:  - " "light touch: Intact  - vibration: Intact  - temperature: Inconsistent in the left arm and left neck  - pinprick: NT  - proprioception: NT  - Romberg: absent    COORDINATION:  - finger to nose: normal, no ataxia on exam  - finger tapping: rapid and accurate, bilaterally  - foot tapping: Rapid and accurate, bilaterally  - foot inversion/ eversion: Good range of motion, bilaterally  - clonus: NT  - sitting to standing: Able to go from sitting to standing without use of arms    REFLEXES:  Reflex Right Left   BR 2+ 2+   Biceps 2+ 2+   Triceps 2+ 2+   Patellae 2+ 2+   Achilles 2+ 2+   Toes NT NT     GAIT:  - narrow base and normal, with normal arm swing  - heel-walk: Intact  - toe-walk: Intact  - tandem gait: intact    REVIEW OF IMAGING STUDIES: I reviewed the following studies:  MRI Brain:  Date: 3/26 2011  W/o and w/ contrast?:  With and without  Indication: \"Right-sided numbness\"  Comparison: None  Impression:  1.  MRI OF THE BRAIN WITHOUT AND WITH CONTRAST WITHIN NORMAL LIMITS.     2.  There is no evidence of periventricular and subcortical T2   hyperintensities to suggest demyelinating plaques.     REVIEW OF LABORATORY STUDIES:  10/4/2024 hemoglobin A1c 4.4  10/4/2024 CMP WNL except CO2 19 globulin 3.7  10/4/24 lipid panel WNL  10/4/2024 TSH WNL  10/4/2024 CBC WNL    ASSESSMENT& PLAN:  1. Migraine with aura and without status migrainosus, not intractable  Breanne presents to establish with a neurology provider for continued management of her migraines.  Currently she is on Topamax 50 mg twice daily which she has been on for years.  She does report increased headaches and migraines when combined average more than 15+ days a month of headaches and migraines.  She contacted her primary provider with asking to increase Topamax her primary sent her to neurology for further evaluation and treatment options.  Topamax is the only preventative that she has used so far.  She is currently on naratriptan which is not sufficient " at aborting her migraine.  Her neurological exam revealed: No concerning neurological findings.  She did have an MRI of her brain in 2011 which was within normal limits.  We did discuss an MRI of her C-spine as she is having some numbness and tingling in her upper extremities as well and feels that position of her neck at work is a big trigger for her migraines.  She is agreeable to an MRI of her C-spine.  For prevention she will need to try 1 more older preventative medication before moving onto the G pants class or Botox.  We discussed: Amitriptyline, nortriptyline, propranolol, and acetazolamide.  She is currently on Wellbutrin and Trintellix so I will be unable to give her amitriptyline or nortriptyline.  For her next rescue medication Breanne would like to try propranolol.  Administration, side effects, and dosing were discussed.  She will need a new rescue medication as naratriptan is not effective at fully aborting her migraine.  We discussed: Eletriptan and zolmitriptan.  For her next rescue medication she would like to try eletriptan.  Administration, side effects, and redosing were discussed.  She does not have nausea associated with her migraines so I will not give her Zofran.  We did discuss lifestyle changes such as: Adequate sleep, staying hydrated, stress reduction, and not overusing over-the-counter analgesics.  Currently Breanne is taking Advil 2 or 3 tablets daily.  We did discuss that this can trigger rebound headaches.  We also discussed over-the-counter supplements for migraine prevention listed below.  We discussed titration off of Topamax slowly taking longer time to titrate off if need be.  She can contact me via [employer] with any updates, concerns, or questions.  Otherwise she will follow-up with me in 3 weeks to discuss her current medication regimen and its effect on her headaches and migraines.  - eletriptan (RELPAX) 40 MG tablet; Take 1 Tablet by mouth one time as needed (migraine) for up  to 1 dose. Can re-dose in 2 hours. Max is 2 in 24 hours.  Dispense: 9 Tablet; Refill: 7  - MR-CERVICAL SPINE-WITH & W/O; Future  - propranolol LA (INDERAL LA) 60 MG CAPSULE SR 24 HR; Take 1 Capsule by mouth every day.  Dispense: 30 Capsule; Refill: 11   Try supplementing:  - magnesium: 400-600 mg once or 200-300 mg twice daily  - riboflavin (vitamin B2): 400 mg once daily  - coenzyme Q10: 300 mg daily     BILLING DOCUMENTATION:   I spent 74 minutes in patient care. This includes time with chart review, pre-charting, records review, discussions with other healthcare providers, and documentation. This also includes face-to-face time with the patient for: exam review, discussion and treatment planning.      Eliz James MSN APRN-CNP  [facility] [place]

## 2025-04-18 NOTE — TELEPHONE ENCOUNTER
Still showing the same error about plan limitation, not sure what to do at this point the pharmacy has no idea. Milena Saucedo, Med Ass't

## 2025-04-18 NOTE — TELEPHONE ENCOUNTER
Pharmacy called back and they said that when they tried to run the eletriptan script it kept stating the amount limitation but did not give them an amount. Milena Saucedo, Med Ass't

## 2025-04-18 NOTE — TELEPHONE ENCOUNTER
It was sent for plan limitation called the pharmacy to see what the amount needs to be but it only allows voice mails to be left. Milena Saucedo, Med Ass't

## 2025-05-02 ENCOUNTER — APPOINTMENT (OUTPATIENT)
Dept: RADIOLOGY | Facility: MEDICAL CENTER | Age: 45
End: 2025-05-02
Attending: FAMILY MEDICINE
Payer: COMMERCIAL

## 2025-05-02 DIAGNOSIS — Z12.31 VISIT FOR SCREENING MAMMOGRAM: ICD-10-CM

## 2025-05-02 PROCEDURE — 77067 SCR MAMMO BI INCL CAD: CPT

## 2025-05-07 ENCOUNTER — HOSPITAL ENCOUNTER (OUTPATIENT)
Dept: RADIOLOGY | Facility: MEDICAL CENTER | Age: 45
End: 2025-05-07
Payer: COMMERCIAL

## 2025-05-07 DIAGNOSIS — G43.109 MIGRAINE WITH AURA AND WITHOUT STATUS MIGRAINOSUS, NOT INTRACTABLE: ICD-10-CM

## 2025-05-07 PROCEDURE — A9579 GAD-BASE MR CONTRAST NOS,1ML: HCPCS | Mod: JZ

## 2025-05-07 PROCEDURE — 72156 MRI NECK SPINE W/O & W/DYE: CPT

## 2025-05-07 PROCEDURE — 700117 HCHG RX CONTRAST REV CODE 255: Mod: JZ

## 2025-05-07 RX ADMIN — GADOTERIDOL 14 ML: 279.3 INJECTION, SOLUTION INTRAVENOUS at 08:50

## 2025-05-13 NOTE — PROGRESS NOTES
[facility]  GENERAL NEUROLOGY  FOLLOW UP VISIT    HISTORY OF ILLNESS:  Breanne Andrea is a 45 y.o. woman with a history most notable for migraine.  Breanne is here for follow-up to discuss her current treatment regimen for her migraines and MRI results.  Today, Breanne provided the following history:  5/14/25- She is reporting approximately 4 migraines 1 headaches in the last month.Intesity for the migraine is now 3-4/10. Eletriptan 0.5/10 does make her sleepy.     Below information was gathered at previous appointment  Migraine description:  Sometimes fatigue and irritable. She reports approximately 1 day before migraine starts. Migraine pain starts in the right frontal region and in the corner of the right eye then will radiate to the left side. Quality of migraine is described as stabbing, ice pick, and throbbing. Intensity of migraine without medication 5-10/10. Naratriptan 2-3/10 and does have to re-dose. Advil 4-10/10. Migraines can last for 1 days to 5 days. Post migraine hangover of fatigue and brain fog for one day. Frequency of migraine is once a week. Associated symptoms: rare light sensitivity, brain fog, difficulty with word finding, neck tension. Trigger: barometric pressure change, stress, dehydration, skipping meals, altitude change. Denies being able to trigger with sudden position change, bending, position change, laughing, sneezing.Can trigger with crying and coughing. Denies double vision. Migraines occur in the afternoon or evening. She does snore occasionally. She is tired after a full night sleep. She does grind her teeth and clenches her jaw at night wears a . She is sleepy throughout the day. She does occasionally startle herself awake but denies needing to take a deep breath. She reports that her dad has sleep apnea.     Headaches description:  Denies aura before headache. Headache occurs in the right frontal/temporal region. Can radiate to the left frontal region or the  whole head. Quality of headache is ache. Intensity of headache 3/10. Advil 1/10. Headache can last 6 to 12 hours. Frequency of headache is 2-3 times a week. Associated symptoms: light sensitivity. Triggers: none. Denies being able to trigger with sudden position change, bending, position change, laughing, or sneezing. Can trigger with sneezing or crying. Denies double vision. Headaches occur more frequently in the afternoon or evening.     The following is a summary of headache symptoms, presented in my standard format:    Family History: mother   Age at onset (years): 30s  Location: see above   Radiation: see above   Frequency: see above   Duration: see above   Headache Days/Month: January 15/30, February 15/28, March 7/30, April 4/18  Quality: see above   Intensity: see above   Aura: see above   Photophobia/Phonophobia/Nausea/Vomiting: yes, no, no,no  Provoked by Physical Activity?:   Triggers: see above   Associated Symptoms: see above   Autonomic Signs (such as ptosis, miosis, conjunctival injection, rhinorrhea, increased lacrimation): no  Head Trauma: no   Association with Menses: yes  ED Visits: no  Hospitalizations: no  Missed Work Days (): power through  Sleep (hours/night): 7 hours of sleep   Caffeine Intake: 1 redbull in the morning   Hydration: yes  Nutrition: she does skip can trigger migraine  Exercise: does not trigger  Analgesic Overuse: Advil 2-3 tablets daily     Current Medication Regimen:  - eletriptan  - propranolol       Medications Tried: Response  Preventive:  - Topamax 50 mg    Rescue:  - naratriptan     Medications Not Tried:  -     MEDICAL AND SURGICAL HISTORY:  Past Medical History:   Diagnosis Date    Anxiety     Chronic eczema     Depression 02/18/2014    GERD (gastroesophageal reflux disease)     Migraine     Papanicolaou smear of anus with high grade squamous intraepithelial lesion (HGSIL)     managed by GYN,Focal high grade squamous intraepithelial lesion/cervical     Recurrent major depressive disorder, in remission ([facility]) 02/18/2014     No past surgical history on file.  MEDICATIONS:  Current Outpatient Medications   Medication Sig    eletriptan (RELPAX) 40 MG tablet TAKE 1 TABLET BY MOUTH ONE TIME AS NEEDED (MIGRAINE) FOR UP TO 1 DOSE. CAN RE-DOSE IN 2 HOURS. MAX IS 2 IN 24 HOURS.    D-Mannose 350 MG Cap     estradiol (VIVELLE-DOT) 0.075 MG/24HR PATCH BIWEEKLY APPLY 1 PATCH TO SKIN TWICE PER WEEK    progesterone (PROMETRIUM) 200 MG capsule     Folic Acid (FOLATE PO) Take  by mouth.    propranolol LA (INDERAL LA) 60 MG CAPSULE SR 24 HR Take 1 Capsule by mouth every day.    topiramate (TOPAMAX) 50 MG tablet TAKE 1 TABLET BY MOUTH TWICE A DAY    TRINTELLIX 10 MG tablet Take 10 mg by mouth every day.    docusate sodium (COLACE) 100 MG Cap Take 100 mg by mouth every day.    buPROPion (WELLBUTRIN SR) 200 MG SR tablet Take 200 mg by mouth 2 times a day. Takes in the AM and Afternoon    ALPRAZolam (XANAX) 0.25 MG Tab Take 0.25 mg by mouth 1 time a day as needed for Anxiety.    vitamin D (CHOLECALCIFEROL) 1000 UNIT Tab Take 1,000 Units by mouth every day.    omeprazole (PRILOSEC) 20 MG CPDR Take 20 mg by mouth every day.     SOCIAL HISTORY:  Social History     Tobacco Use    Smoking status: Never    Smokeless tobacco: Never   Substance Use Topics    Alcohol use: Yes     Alcohol/week: 3.0 oz     Types: 5 Shots of liquor per week     Comment: everyday hard liquor     Social History     Social History Narrative    She works for [employer] as an     Currently  from , no children     FAMILY HISTORY:  Family History   Problem Relation Age of Onset    Cancer Mother         anal cancer , liver mets    Hypertension Mother     Heart Disease Father     Hyperlipidemia Father     Alcohol abuse Father     Drug abuse Father     Cancer Paternal Grandmother         GI    Stroke Maternal Grandmother     Heart Disease Paternal Grandfather      Ambulatory Vitals  Encounter  "Vitals  Temperature: 36.5 °C (97.7 °F)  Temp src: Temporal  Blood Pressure: 120/70  Pulse: 69  Pulse Oximetry: 95 %  Weight: 69.7 kg (153 lb 10.6 oz)  Height: 165.1 cm (5' 5\")  BMI (Calculated): 25.57       REVIEW OF SYSTEMS:  A ROS was completed.  Pertinent positives and negatives were included in the HPI, above.  All other systems were reviewed and are negative.    PHYSICAL EXAM:  General/Medical:  Breanne presents clean and well-dressed.  She does not appear in any acute distress at this time.  She has no malar rash.  She has good range of motion of her neck.  She reports no jaw claudication or jaw pain.  She reports no allodynia.  She has no upper or lower extremity edema.  She has palpable radial pulses.  She has good capillary refill in the upper extremities.  Auscultation of her heart revealed S1 and S2 at an increased rate with no other abnormal sounds.  Vital signs are listed above and are within normal limits.  Neuro:  MENTAL STATUS: awake and alert; no deficits of speech or language; oriented to person, place, and time; affect was appropriate to situation    CRANIAL NERVES:    II: acuity: JNT/JNT, fields: intact to confrontation, pupils: 3/3 to 2/2 without a relative afferent pupillary defect, discs: NT    III/IV/VI: versions: intact without nystagmus    V: facial sensation: symmetric to light touch    VII: facial expression: symmetric    VIII: hearing: intact to finger rub    IX/X: palate: elevates symmetrically    XI: shoulder shrug: symmetric    XII: tongue: midline    MOTOR:  - bulk: NT  - tone: NT  Upper Extremity Strength  (R/L)    NT   Elbow flexion NT   Elbow extension NT   Shoulder abduction NT     Lower Extremity Strength  (R/L)   Hip flexion NT   Knee extension NT   Knee flexion NT   Ankle plantarflexion NT   Ankle dorsiflexion NT     - pronator drift:NT  - abnormal movements: none    SENSATION:  - light touch:NT  - vibration: NT  - temperature:NT  - pinprick: NT  - proprioception: NT  - " "Romberg: NT    COORDINATION:  - finger to nose: NT  - finger tapping: NT  - foot tapping: NT  - foot inversion/ eversion: NT  - clonus: NT  - sitting to standing: NT    REFLEXES:  Reflex Right Left   BR NT NT   Biceps NT NT   Triceps NT NT   Patellae NT NT   Achilles NT NT   Toes NT NT     GAIT:  - narrow base and normal, with normal arm swing  - heel-walk: NT  - toe-walk:NT  - tandem gait: NT    REVIEW OF IMAGING STUDIES: I reviewed the following studies:  MRI Brain:  Date: 3/26 2011  W/o and w/ contrast?:  With and without  Indication: \"Right-sided numbness\"  Comparison: None  Impression:  1.  MRI OF THE BRAIN WITHOUT AND WITH CONTRAST WITHIN NORMAL LIMITS.     2.  There is no evidence of periventricular and subcortical T2   hyperintensities to suggest demyelinating plaques.     MRI C-spine:  Date: 5/7/2025  With and without contrast: With and without  Indication: Headaches with other neurological issues  Comparison: None  Impression:  Mid cervical degenerative disease as described above. There is moderate right C6 neural foraminal stenosis. These findings are new since the previous study.     REVIEW OF LABORATORY STUDIES:  10/4/2024 hemoglobin A1c 4.4  10/4/2024 CMP WNL except CO2 19 globulin 3.7  10/4/24 lipid panel WNL  10/4/2024 TSH WNL  10/4/2024 CBC WNL    ASSESSMENT& PLAN:  1. Migraine with aura and without status migrainosus, not intractable (Primary)  Breanne presents for follow-up to discuss her current medication regimen and its effect on her headaches and migraines.  At her last appointment she was titrated off of Topamax and started on propranolol.  Since being on propranolol she reports 4 migraines and 1 headache since her last appointment.  She feels the intensity of her migraines have decreased to is 3-4/10.  She did take Eletriptan for her migraines which was pretty effective bringing down the pain to a 0.5 but did make her sleepy.  We did discuss a sample of Nurtec to try in the event that it " works better for her than Eletriptan we can switch to Nurtec.  We did discuss a referral to pain management as her MRI of her C-spine did show degenerative disc disease and a moderate right C6 neuroforaminal stenosis.  She is agreeable to a referral to pain management.  We did discuss that since I will be going on maternity leave soon will not be back until August a short burst of steroids in the event that she has status migrainosus or unrelenting migraines or headaches.  She is agreeable to a short burst of steroids in the event that she has status migrainosus.  We did discuss increasing her propranolol to 120 but she would like to defer and see pain management prior to adjusting her meds further.  She can contact me via [employer] with any updates, concerns, or questions.  Otherwise she will follow-up with me in September.  - Referral to Pain Management  - Rimegepant Sulfate (NURTEC) 75 MG TABLET DISPERSIBLE; Take 1 Tablet by mouth as needed (migraine). Take at headache onset. Repeat once in 24 hours  Dispense: 8 Tablet; Refill: 0  - dexamethasone (DECADRON) 2 MG tablet; Take 4 Tablets by mouth every day for 1 day, THEN 3 Tablets every day for 1 day, THEN 2 Tablets every day for 1 day, THEN 1 Tablet every day for 1 day.  Dispense: 10 Tablet; Refill: 0     BILLING DOCUMENTATION:   I spent 24 minutes in patient care. This includes time with chart review, pre-charting, records review, discussions with other healthcare providers, and documentation. This also includes face-to-face time with the patient for: exam review, discussion and treatment planning.      Eliz James MSN APRN-CNP  [facility] [place]

## 2025-05-14 ENCOUNTER — OFFICE VISIT (OUTPATIENT)
Dept: NEUROLOGY | Facility: MEDICAL CENTER | Age: 45
End: 2025-05-14
Payer: COMMERCIAL

## 2025-05-14 VITALS
HEART RATE: 69 BPM | HEIGHT: 65 IN | SYSTOLIC BLOOD PRESSURE: 120 MMHG | DIASTOLIC BLOOD PRESSURE: 70 MMHG | OXYGEN SATURATION: 95 % | WEIGHT: 153.66 LBS | BODY MASS INDEX: 25.6 KG/M2 | TEMPERATURE: 97.7 F

## 2025-05-14 DIAGNOSIS — G43.109 MIGRAINE WITH AURA AND WITHOUT STATUS MIGRAINOSUS, NOT INTRACTABLE: Primary | ICD-10-CM

## 2025-05-14 PROCEDURE — 99213 OFFICE O/P EST LOW 20 MIN: CPT

## 2025-05-14 PROCEDURE — 3078F DIAST BP <80 MM HG: CPT

## 2025-05-14 PROCEDURE — 3074F SYST BP LT 130 MM HG: CPT

## 2025-05-14 RX ORDER — DEXAMETHASONE 2 MG/1
TABLET ORAL
Qty: 10 TABLET | Refills: 0 | Status: SHIPPED | OUTPATIENT
Start: 2025-05-14 | End: 2025-05-18

## 2025-05-14 RX ORDER — RIMEGEPANT SULFATE 75 MG/75MG
1 TABLET, ORALLY DISINTEGRATING ORAL PRN
Qty: 8 TABLET | Refills: 0 | Status: SHIPPED | OUTPATIENT
Start: 2025-05-14

## 2025-05-14 ASSESSMENT — PATIENT HEALTH QUESTIONNAIRE - PHQ9
SUM OF ALL RESPONSES TO PHQ QUESTIONS 1-9: 8
5. POOR APPETITE OR OVEREATING: 1 - SEVERAL DAYS
CLINICAL INTERPRETATION OF PHQ2 SCORE: 3

## 2025-05-14 ASSESSMENT — FIBROSIS 4 INDEX: FIB4 SCORE: 0.46

## 2025-05-28 ENCOUNTER — OFFICE VISIT (OUTPATIENT)
Dept: PHYSICAL MEDICINE AND REHAB | Facility: MEDICAL CENTER | Age: 45
End: 2025-05-28
Payer: COMMERCIAL

## 2025-05-28 VITALS
BODY MASS INDEX: 25.62 KG/M2 | OXYGEN SATURATION: 96 % | DIASTOLIC BLOOD PRESSURE: 80 MMHG | HEART RATE: 76 BPM | WEIGHT: 153.8 LBS | SYSTOLIC BLOOD PRESSURE: 122 MMHG | HEIGHT: 65 IN | TEMPERATURE: 98 F

## 2025-05-28 DIAGNOSIS — G43.009 MIGRAINE WITHOUT AURA AND WITHOUT STATUS MIGRAINOSUS, NOT INTRACTABLE: Primary | ICD-10-CM

## 2025-05-28 ASSESSMENT — PATIENT HEALTH QUESTIONNAIRE - PHQ9
5. POOR APPETITE OR OVEREATING: 1 - SEVERAL DAYS
SUM OF ALL RESPONSES TO PHQ QUESTIONS 1-9: 7
CLINICAL INTERPRETATION OF PHQ2 SCORE: 2

## 2025-05-28 ASSESSMENT — PAIN SCALES - GENERAL: PAINLEVEL_OUTOF10: 1=MINIMAL PAIN

## 2025-05-28 ASSESSMENT — FIBROSIS 4 INDEX: FIB4 SCORE: 0.46

## 2025-05-28 NOTE — PROGRESS NOTES
[facility] (Physical Medicine and Rehabilitation)  Sports Medicine& Interventional Spine   New Patient Visit    Patient Name: Breanne Andrea   Patient : 1980  PCP: Leonarda Patrick M.D.  MRN: [id number]     Date of service: 25    Referring provider: Eliz James A*    CHIEF COMPLAINT  No chief complaint on file.        Breanne Andrea is a 45 y.o. very pleasant female  who presents today with The encounter diagnosis was Migraine without aura and without status migrainosus, not intractable.    Verbal consent was obtained for [place] copilot: Yes      Medical records review:  I reviewed the note from the referring provider Eliz James A* including the note dated 25.    Prior Relevant MSK/Interventional Procedures:   Patient has not attempted prior ortho/joint injections.   Patient has not had prior ortho/joint surgery.           HPI:    History of Present Illness  The patient is a 45-year-old female who presents today for an initial evaluation of migraines and neck stiffness.    She has a history of chronic migraines. Initially, her migraines were associated with her menstrual cycle, but they have since become more frequent, occurring at least once a week. Over the past month, she has experienced approximately 5 migraines. She was recently titrated off Topamax and started on propranolol, which has resulted in a decrease in the intensity of her migraines. Despite this, she continues to experience headaches. She takes a triptan for her migraines. She was also recently started on Nurtec and placed on a trial of dexamethasone for her headaches. She recently transitioned from naratriptan to another triptan, which appears to be effective.    She reports an aching pain in the neck with some numbness radiating to the right upper trapezius region. She underwent an MRI scan and was found to have degenerative discs in her neck. Her neurologist suggested that pain management might  alleviate her headaches. She experiences neck stiffness but does not report any associated pain. Occasionally, she experiences discomfort when straightening her hair or moving incorrectly. She reports no pain in the shoulders or hands, and no numbness or tingling in these areas. She also reports no changes in sensation in her hands. She experiences pain when holding her phone or book for extended periods.    MEDICATIONS  Current: Propranolol, triptan, Nurtec, dexamethasone.  Discontinued: Topamax, naratriptan.    GOALS OF TREATMENT: Symptom Pain relief.     Psychological testing for pain as depression and pain commonly coexist and need to both be treated.     Opioid Risk Score: No value filed.      Interpretation of Opioid Risk Score   Score 0-3 = Low risk of abuse. Do UDS at least once per year.  Score 4-7 = Moderate risk of abuse. Do UDS 1-4 times per year.  Score 8+ = High risk of abuse. Refer to specialist.    PHQ      4/18/2025     9:40 AM 5/14/2025     7:20 AM 5/28/2025     1:20 PM   Depression Screen (PHQ-2/PHQ-9)   PHQ-2 Total Score 2 3 2   PHQ-9 Total Score 7 8 7       Interpretation of PHQ-9 Total Score   Score Severity   1-4 No Depression   5-9 Mild Depression   10-14 Moderate Depression   15-19 Moderately Severe Depression   20-27 Severe Depression      PMHx:   Past Medical History[1]    PSHx:   Past Surgical History[2]    Family history   Family History   Problem Relation Age of Onset    Cancer Mother         anal cancer , liver mets    Hypertension Mother     Heart Disease Father     Hyperlipidemia Father     Alcohol abuse Father     Drug abuse Father     Cancer Paternal Grandmother         GI    Stroke Maternal Grandmother     Heart Disease Paternal Grandfather        Medications: reviewed on epic.   Active Medications[3]     Allergies:   Allergies[4]    Social Hx:   Social History     Socioeconomic History    Marital status:      Spouse name: Not on file    Number of children: 0    Years of  "education: Not on file    Highest education level: Not on file   Occupational History    Occupation:      Comment: Works at [employer]   Tobacco Use    Smoking status: Never    Smokeless tobacco: Never   Vaping Use    Vaping status: Never Used   Substance and Sexual Activity    Alcohol use: Yes     Alcohol/week: 3.0 oz     Types: 5 Shots of liquor per week     Comment: everyday hard liquor    Drug use: No    Sexual activity: Yes     Partners: Male     Birth control/protection: Pill     Comment:    Other Topics Concern    Not on file   Social History Narrative    She works for [employer] as an     Currently  from , no children     Social Drivers of Health     Financial Resource Strain: Not on file   Food Insecurity: Not on file   Transportation Needs: Not on file   Physical Activity: Sufficiently Active (3/31/2021)    Exercise Vital Sign     Days of Exercise per Week: 5 days     Minutes of Exercise per Session: 40 min   Stress: Not on file   Social Connections: Not on file   Intimate Partner Violence: Not on file   Housing Stability: Not on file         EXAMINATION   Vitals: /80 (BP Location: Right arm, Patient Position: Sitting)   Pulse 76   Temp 36.7 °C (98 °F) (Temporal)   Ht 1.651 m (5' 5\")   Wt 69.8 kg (153 lb 12.8 oz)   SpO2 96%   Physical Exam:     Body Habitus: Body mass index is 25.59 kg/m².  Appearance: Well-groomed, well-nourished, not disheveled  Eyes: No scleral icterus to suggest severe liver disease, no proptosis to suggest severe hyperthyroid  ENT -no obvious auditory deficits, no external lesions, moist mucus membranes   Skin -no rashes or lesions noted. No appreciable skin breakdown on exposed skin areas.    Respiratory-  breathing comfortably on room air, no audible wheezing, full sentences  Cardiovascular- No lower extremity edema noted.   Psychiatric- alert and oriented, calm, comfortable, cooperative     Musculoskeletal and Neuro:  Gait and " "station - normal gait with reciprocal pattern,  no presence/use of ambulatory device, no arm assistance with sit-to-stand, nonantalgic. no loss of balance during exam.  No change in patient's demeanor with exam. Grossly normal cranial nerve exam  Coordination grossly intact      Physical Exam  The cervical spine shows a normal range of motion with extension, flexion, side bending, and lateral rotation. Both upper extremities demonstrate full strength, rated at 5 out of 5. There is a mild decrease in sensation to light touch on the left hand, more pronounced on the ulnar side compared to the radial side. No paresthesias are observed.                MEDICAL DECISION MAKING    Medical records review: see under HPI section.     DATA    Labs:   Lab Results   Component Value Date/Time    SODIUM 138 10/04/2024 08:30 AM    POTASSIUM 3.9 10/04/2024 08:30 AM    CHLORIDE 106 10/04/2024 08:30 AM    CO2 19 (L) 10/04/2024 08:30 AM    ANION 13.0 10/04/2024 08:30 AM    GLUCOSE 83 10/04/2024 08:30 AM    BUN 13 10/04/2024 08:30 AM    CREATININE 0.95 10/04/2024 08:30 AM    CALCIUM 9.0 10/04/2024 08:30 AM    ASTSGOT 17 10/04/2024 08:30 AM    ALTSGPT 18 10/04/2024 08:30 AM    TBILIRUBIN 0.4 10/04/2024 08:30 AM    ALBUMIN 4.0 10/04/2024 08:30 AM    TOTPROTEIN 7.7 10/04/2024 08:30 AM    GLOBULIN 3.7 (H) 10/04/2024 08:30 AM    AGRATIO 1.1 10/04/2024 08:30 AM   ]    No results found for: \"PROTHROMBTM\", \"INR\"     Lab Results   Component Value Date/Time    WBC 7.5 10/04/2024 08:30 AM    RBC 4.53 10/04/2024 08:30 AM    HEMOGLOBIN 13.9 10/04/2024 08:30 AM    HEMATOCRIT 42.7 10/04/2024 08:30 AM    MCV 94.3 10/04/2024 08:30 AM    MCH 30.7 10/04/2024 08:30 AM    MCHC 32.6 10/04/2024 08:30 AM    MPV 9.8 10/04/2024 08:30 AM    NEUTSPOLYS 56.10 10/04/2024 08:30 AM    LYMPHOCYTES 35.40 10/04/2024 08:30 AM    MONOCYTES 5.10 10/04/2024 08:30 AM    EOSINOPHILS 2.40 10/04/2024 08:30 AM    BASOPHILS 0.70 10/04/2024 08:30 AM        Lab Results   Component " Value Date/Time    HBA1C 4.4 10/04/2024 08:30 AM        Imaging:   I personally reviewed following images, these are my reads  Results  Imaging  MRI of the cervical spine dated 2025 shows mild diffuse disk bulge at C6-C7 as well as diffuse disk bulge at C5-C6 with bilateral facet arthropathy, moderate right neuroforaminal stenosis.        IMAGING radiology reads. I reviewed the following radiology reads     Results for orders placed during the hospital encounter of 11    MR-BRAIN-WITH & W/O                 Results for orders placed during the hospital encounter of 25    MR-CERVICAL SPINE-WITH & W/O    Impression  Mid cervical degenerative disease as described above. There is moderate right C6 neural foraminal stenosis. These findings are new since the previous study.      Results for orders placed during the hospital encounter of 11    MR-LUMBAR SPINE-WITH & W/O       Results for orders placed during the hospital encounter of 11    MR-THORACIC SPINE-WITH & W/O                                              Results for orders placed in visit on 24    DX-FINGER(S) 2+ LEFT    Impression  No acute process.                                          ASSESSMENT AND PLAN:  Breanne Andrea   : 1980     Diagnoses and all orders for this visit:  1. Migraine without aura and without status migrainosus, not intractable            Assessment & Plan  1. Migraines.  Her migraines have shown improvement with recent medication adjustments, including being titrated off Topamax and starting propranolol. She also uses a triptan for acute relief and has been started on Nurtec and a trial of dexamethasone. Given the current frequency of migraines, approximately once a week, interventional treatment is not recommended at this time. She is advised to continue following up with her neurologist for ongoing migraine management.    2. Neck stiffness.  She reports neck stiffness without significant pain  or symptoms such as numbness or tingling. Recent MRI of the cervical spine shows mild diffuse disk bulge at C6-7 and C5-C6 with bilateral facet arthropathy and moderate right neuroforaminal stenosis. However, she is not symptomatic from these findings. No interventional treatments are recommended at this time. If symptoms worsen or new symptoms develop, further evaluation can be considered.    Follow-up  The patient will follow up as needed.      No orders of the defined types were placed in this encounter.      Follow-up: As needed    Please note that this dictation was created using voice recognition software. I have made every reasonable attempt to correct obvious errors but there may be errors of grammar and content that I may have overlooked prior to finalization of this note.    Rob Rene DO  Physical Medicine and Rehabilitation  Sports Medicine and Spine  [facility]           CC Leonarda Patrick M.D.   CC Eliz James A*         [1]   Past Medical History:  Diagnosis Date    Anxiety     Chronic eczema     Depression 02/18/2014    GERD (gastroesophageal reflux disease)     Migraine     Papanicolaou smear of anus with high grade squamous intraepithelial lesion (HGSIL)     managed by GYN,Focal high grade squamous intraepithelial lesion/cervical    Recurrent major depressive disorder, in remission ([facility]) 02/18/2014   [2] No past surgical history on file.  [3]   Outpatient Medications Marked as Taking for the 5/28/25 encounter (Office Visit) with Rob Rene D.O.   Medication Sig Dispense Refill    D-Mannose 350 MG Cap       estradiol (VIVELLE-DOT) 0.075 MG/24HR PATCH BIWEEKLY APPLY 1 PATCH TO SKIN TWICE PER WEEK      progesterone (PROMETRIUM) 200 MG capsule       Folic Acid (FOLATE PO) Take  by mouth.      propranolol LA (INDERAL LA) 60 MG CAPSULE SR 24 HR Take 1 Capsule by mouth every day. 30 Capsule 11    eletriptan (RELPAX) 40 MG tablet TAKE 1 TABLET BY MOUTH ONE TIME AS NEEDED (MIGRAINE)  FOR UP TO 1 DOSE. CAN RE-DOSE IN 2 HOURS. MAX IS 2 IN 24 HOURS. 6 Tablet 7    TRINTELLIX 10 MG tablet Take 10 mg by mouth every day.      docusate sodium (COLACE) 100 MG Cap Take 100 mg by mouth every day.      buPROPion (WELLBUTRIN SR) 200 MG SR tablet Take 200 mg by mouth 2 times a day. Takes in the AM and Afternoon      vitamin D (CHOLECALCIFEROL) 1000 UNIT Tab Take 1,000 Units by mouth every day.      omeprazole (PRILOSEC) 20 MG CPDR Take 20 mg by mouth every day.     [4] No Known Allergies